# Patient Record
Sex: FEMALE | Race: BLACK OR AFRICAN AMERICAN | URBAN - METROPOLITAN AREA
[De-identification: names, ages, dates, MRNs, and addresses within clinical notes are randomized per-mention and may not be internally consistent; named-entity substitution may affect disease eponyms.]

---

## 2018-08-29 ENCOUNTER — TELEPHONE (OUTPATIENT)
Dept: FAMILY MEDICINE CLINIC | Age: 48
End: 2018-08-29

## 2018-08-29 NOTE — TELEPHONE ENCOUNTER
Request sent to us for triancinolone 0.1% 30g cubes. We have not prescribed this since 2015. Patient last seen in office 12/17 was told to follow up in 6 months. Please advise.

## 2018-08-30 RX ORDER — TRIAMCINOLONE ACETONIDE 1 MG/G
CREAM TOPICAL 3 TIMES DAILY
Qty: 15 G | Refills: 0 | Status: SHIPPED | OUTPATIENT
Start: 2018-08-30 | End: 2018-12-20 | Stop reason: SDUPTHER

## 2018-08-30 NOTE — TELEPHONE ENCOUNTER
Patient called stating that she had requested a refill but no called to tell her that she needed to be seen to get a refill. Patient has been scheduled for 9/7/18 at 3:30. Patient says she has exzema and has a lot of itching but wanted to know if she could get some sort of script to get her through the weekend. Patient would like a call back. Thank you.

## 2018-10-01 RX ORDER — SUMATRIPTAN 25 MG/1
25 TABLET, FILM COATED ORAL
COMMUNITY
End: 2018-12-20 | Stop reason: SDUPTHER

## 2018-10-01 RX ORDER — VALSARTAN AND HYDROCHLOROTHIAZIDE 160; 12.5 MG/1; MG/1
1 TABLET, FILM COATED ORAL DAILY
COMMUNITY
End: 2018-12-20 | Stop reason: SDUPTHER

## 2018-10-01 RX ORDER — AZELASTINE HYDROCHLORIDE 0.5 MG/ML
SOLUTION/ DROPS OPHTHALMIC 2 TIMES DAILY
COMMUNITY
End: 2018-12-20 | Stop reason: ALTCHOICE

## 2018-12-20 ENCOUNTER — OFFICE VISIT (OUTPATIENT)
Dept: FAMILY MEDICINE CLINIC | Age: 48
End: 2018-12-20

## 2018-12-20 VITALS
BODY MASS INDEX: 33.95 KG/M2 | HEIGHT: 68 IN | OXYGEN SATURATION: 100 % | SYSTOLIC BLOOD PRESSURE: 146 MMHG | DIASTOLIC BLOOD PRESSURE: 88 MMHG | WEIGHT: 224 LBS | TEMPERATURE: 98.1 F | HEART RATE: 91 BPM | RESPIRATION RATE: 16 BRPM

## 2018-12-20 DIAGNOSIS — Z00.00 ANNUAL PHYSICAL EXAM: Primary | ICD-10-CM

## 2018-12-20 DIAGNOSIS — D50.9 IRON DEFICIENCY ANEMIA, UNSPECIFIED IRON DEFICIENCY ANEMIA TYPE: ICD-10-CM

## 2018-12-20 DIAGNOSIS — E55.9 VITAMIN D DEFICIENCY: ICD-10-CM

## 2018-12-20 DIAGNOSIS — G43.709 CHRONIC MIGRAINE WITHOUT AURA WITHOUT STATUS MIGRAINOSUS, NOT INTRACTABLE: ICD-10-CM

## 2018-12-20 DIAGNOSIS — L30.8 OTHER ECZEMA: ICD-10-CM

## 2018-12-20 DIAGNOSIS — T78.40XD ALLERGIC STATE, SUBSEQUENT ENCOUNTER: ICD-10-CM

## 2018-12-20 DIAGNOSIS — I10 ESSENTIAL HYPERTENSION: ICD-10-CM

## 2018-12-20 RX ORDER — DIAPER,BRIEF,INFANT-TODD,DISP
EACH MISCELLANEOUS 2 TIMES DAILY
Qty: 30 G | Refills: 0 | Status: SHIPPED | OUTPATIENT
Start: 2018-12-20 | End: 2020-01-07 | Stop reason: SDUPTHER

## 2018-12-20 RX ORDER — SUMATRIPTAN 25 MG/1
25 TABLET, FILM COATED ORAL
Qty: 9 TAB | Refills: 1 | Status: SHIPPED | OUTPATIENT
Start: 2018-12-20 | End: 2018-12-20

## 2018-12-20 RX ORDER — VALSARTAN AND HYDROCHLOROTHIAZIDE 160; 12.5 MG/1; MG/1
1 TABLET, FILM COATED ORAL DAILY
Qty: 90 TAB | Refills: 1 | Status: SHIPPED | OUTPATIENT
Start: 2018-12-20 | End: 2019-01-03

## 2018-12-20 RX ORDER — LEVONORGESTREL AND ETHINYL ESTRADIOL 6-5-10
KIT ORAL
Refills: 1 | COMMUNITY
Start: 2018-10-20 | End: 2019-01-14 | Stop reason: SDUPTHER

## 2018-12-20 RX ORDER — EPINEPHRINE 0.3 MG/.3ML
0.3 INJECTION SUBCUTANEOUS
Qty: 2 SYRINGE | Refills: 1 | Status: SHIPPED | OUTPATIENT
Start: 2018-12-20 | End: 2018-12-20

## 2018-12-20 RX ORDER — ERGOCALCIFEROL 1.25 MG/1
50000 CAPSULE ORAL
Qty: 12 CAP | Refills: 1 | Status: SHIPPED | OUTPATIENT
Start: 2018-12-20 | End: 2019-06-24 | Stop reason: SDUPTHER

## 2018-12-20 RX ORDER — TRIAMCINOLONE ACETONIDE 1 MG/G
CREAM TOPICAL 3 TIMES DAILY
Qty: 15 G | Refills: 0 | Status: SHIPPED | OUTPATIENT
Start: 2018-12-20 | End: 2019-01-30 | Stop reason: SDUPTHER

## 2018-12-20 RX ORDER — EPINEPHRINE 0.3 MG/.3ML
0.3 INJECTION SUBCUTANEOUS
COMMUNITY
End: 2018-12-20 | Stop reason: SDUPTHER

## 2018-12-20 NOTE — PROGRESS NOTES
Subjective  Reji Mena is an 51 y.o. female who presents for annual physical    HPI  Annual Physical  Last Physical 1 year ago  Screening: uptodate on PAP, needs mammogram  Immunization: had flu shot this year  Diet: avoid reds meat, eats chicken and fish, eats more vegetables than carbs  Exercise: crossfit 3x per week        Review of Systems   Constitutional: Negative for appetite change. Negative for activity change, chills, diaphoresis and fatigue. HENT: Negative for congestion and dental problem. Eyes: Negative for discharge. Respiratory: Negative for apnea and chest tightness. Cardiovascular: Negative for chest pain and leg swelling. Allergies - reviewed: Allergies   Allergen Reactions    Fruit C [Ascorbic Acid (Vitamin C)] Hives    Nuts [Tree Nut] Hives    Pcn [Penicillins] Unknown (comments)     None noted          Medications - reviewed:   Current Outpatient Medications   Medication Sig    TRIVORA, 28, 50-30 (6)/75-40 (5)/125-30(10) tab TK 1 T PO QD AS DIRECTED    ergocalciferol (VITAMIN D2) 50,000 unit capsule Take 1 Cap by mouth every seven (7) days.  triamcinolone acetonide (KENALOG) 0.1 % topical cream Apply  to affected area three (3) times daily. use thin layer    hydrocortisone (CORTAID) 0.5 % topical cream Apply  to affected area two (2) times a day. use thin layer    valsartan-hydroCHLOROthiazide (DIOVAN HCT) 160-12.5 mg per tablet Take 1 Tab by mouth daily. No current facility-administered medications for this visit.           Past Medical History - reviewed:  Past Medical History:   Diagnosis Date    Eczema     HTN (hypertension)     Hyperlipidemia     Vitamin D deficiency          Past Surgical History - reviewed:   Past Surgical History:   Procedure Laterality Date    HX LIPOSUCTION           Social History - reviewed:  Social History     Socioeconomic History    Marital status:      Spouse name: Not on file    Number of children: Not on file  Years of education: Not on file    Highest education level: Not on file   Social Needs    Financial resource strain: Not on file    Food insecurity - worry: Not on file    Food insecurity - inability: Not on file    Transportation needs - medical: Not on file   [employer] needs - non-medical: Not on file   Occupational History    Not on file   Tobacco Use    Smoking status: Never Smoker    Smokeless tobacco: Never Used   Substance and Sexual Activity    Alcohol use: No     Frequency: Never    Drug use: No    Sexual activity: Yes   Other Topics Concern    Not on file   Social History Narrative    Not on file         Family History - reviewed:  No family history on file. Visit Vitals  /88   Pulse 91   Temp 98.1 °F (36.7 °C) (Oral)   Resp 16   Ht 5' 8\" (1.727 m)   Wt 224 lb (101.6 kg)   SpO2 100%   BMI 34.06 kg/m²       Physical Exam   Constitutional: well-developed and well-nourished. HENT:   Head: Normocephalic and atraumatic. Eyes: Conjunctivae are normal. Pupils are equal, round, and reactive to light. Neck: Normal range of motion. Neck supple. No JVD present. No tracheal deviation present. No thyromegaly present. Cardiovascular: Normal rate, regular rhythm and normal heart sounds. Exam reveals no friction rub. No murmur heard. Pulmonary/Chest: Effort normal and breath sounds normal. No stridor. No respiratory distress. no wheezes. no rales. no tenderness. Assessment/Plan  1. Annual physical exam: uptodate on immunizations, will order mammogram for yearly screening; will check yearly labs  - CBC WITH AUTOMATED DIFF  - LIPID PANEL  - [facility] MAMMO RT SCREENING INCL CAD; Future  -discussed diet, exercise, and age appropriate recommendations    2. Allergic state, subsequent encounter  - EPINEPHrine (EPIPEN) 0.3 mg/0.3 mL injection; 0.3 mL by IntraMUSCular route once as needed for up to 1 dose. Dispense: 2 Syringe; Refill: 1    3.  Essential hypertension: BP slightly elevated today. Will bring back in 1 week for nurse BP check  - valsartan-hydroCHLOROthiazide (DIOVAN HCT) 160-12.5 mg per tablet; Take 1 Tab by mouth daily. Dispense: 90 Tab; Refill: 1  - METABOLIC PANEL, COMPREHENSIVE  - MICROALBUMIN, UR, RAND W/ MICROALB/CREAT RATIO    4. Vitamin D deficiency  - ergocalciferol (VITAMIN D2) 50,000 unit capsule; Take 1 Cap by mouth every seven (7) days. Dispense: 12 Cap; Refill: 1  - VITAMIN D, 25 HYDROXY; Future    5. Chronic migraine without aura without status migrainosus, not intractable  - SUMAtriptan (IMITREX) 25 mg tablet; Take 1 Tab by mouth once as needed for Migraine for up to 1 dose. Dispense: 9 Tab; Refill: 1    6. Other eczema  - triamcinolone acetonide (KENALOG) 0.1 % topical cream; Apply  to affected area three (3) times daily. use thin layer  Dispense: 15 g; Refill: 0  - hydrocortisone (CORTAID) 0.5 % topical cream; Apply  to affected area two (2) times a day. use thin layer  Dispense: 30 g; Refill: 0    7. Iron deficiency anemia, unspecified iron deficiency anemia type: hx of iron deficiency. Will check CBC to evaluate further  -CBC    8. BMI 34.0-34.9,adult  -discussed diet and exercise recommendations          Follow-up Disposition:  Return in about 2 weeks (around 1/3/2019) for nurse visit for BP check. I have discussed the diagnosis with the patient and the intended plan as seen in the above orders. Patient verbalized understanding of the plan and agrees with the plan. The patient has received an after-visit summary and questions were answered concerning future plans. I have discussed medication side effects and warnings with the patient as well. Informed patient to return to the office if new symptoms arise.         Lb Lema MD  Family Medicine Resident

## 2018-12-20 NOTE — PROGRESS NOTES
Chief Complaint   Patient presents with    Physical     REQUESTING A PAP IF NEEDED    STATES PAP WAS A COUPLE OF YEARS AGO        Health Maintenance Due   Topic    PAP AKA CERVICAL CYTOLOGY     Influenza Age 5 to Adult        Wt Readings from Last 3 Encounters:   12/20/18 224 lb (101.6 kg)     Temp Readings from Last 3 Encounters:   12/20/18 98.1 °F (36.7 °C) (Oral)     BP Readings from Last 3 Encounters:   12/20/18 175/88     Pulse Readings from Last 3 Encounters:   12/20/18 91         Learning Assessment:  :     No flowsheet data found. Depression Screening:  :     PHQ over the last two weeks 12/20/2018   Little interest or pleasure in doing things Not at all   Feeling down, depressed, irritable, or hopeless Not at all   Total Score PHQ 2 0       Fall Risk Assessment:  :     No flowsheet data found. Abuse Screening:  :     No flowsheet data found. Coordination of Care Questionnaire:  :     1) Have you been to an emergency room, urgent care clinic since your last visit? NO   Hospitalized since your last visit? NO             2) Have you seen or consulted any other health care providers outside of [address] [place] [address] since your last visit? NO    (Include any pap smears or colon screenings in this section. PAP  8/2015  NORMAL      Patient is accompanied by self I have received verbal consent from Jose Villafana to discuss any/all medical information while they are present in the room.

## 2018-12-20 NOTE — PATIENT INSTRUCTIONS
Barrier Methods of Birth Control: Care Instructions  Your Care Instructions    Barrier methods of birth control prevent pregnancy by blocking sperm. This stops the sperm from reaching an egg. Types of barrier methods include condoms, diaphragms, cervical caps, and the contraceptive sponge. Barrier methods work better when you use them with a spermicide. This is a substance that kills sperm. Spermicides come in many forms--cream, jelly, gel, foam, film, and suppository. Sometimes they are used alone as a birth control method. In general, barrier methods don't prevent pregnancy as well as IUDs or hormonal methods. The male condom and diaphragm are the barrier methods that work best. The cervical cap and sponge work about as well as a condom or a diaphragm for women who have not had a vaginal birth. But the cap and sponge don't work as well for women who have had a vaginal birth. The female condom does not work as well as a male condom. Use of spermicide alone does not work well to prevent pregnancy. Condoms also protect against sexually transmitted infections (STIs) such as HIV/AIDS and herpes. Other barrier methods do not protect against most STIs. Follow-up care is a key part of your treatment and safety. Be sure to make and go to all appointments, and call your doctor if you are having problems. It's also a good idea to know your test results and keep a list of the medicines you take. What kinds of barrier birth control are available? Barrier methods of birth control include:  · Male condom. This is a thin tube that fits over the penis. Condoms can be made of rubber (latex), plastic, or lambskin. They prevent sperm from getting into the vagina. Rubber and plastic condoms also protect against STIs. Lambskin condoms do not protect against STIs. The condom is placed over the erect penis right before sex. A new condom must be used each time the man has sex.  After 1 year, 13 out of 80 women whose partners use condoms will get pregnant. You can buy condoms without a doctor's prescription. · Female condom. This is a thin plastic pouch that is open on one end. The closed end is placed inside the vagina. The condom then lines the walls of the vagina and prevents sperm from getting into the vagina. The female condom also protects against STIs. A new condom must be used each time the woman has sex. After 1 year, 21 out of 80 women who use female condoms will get pregnant. You can buy female condoms without a doctor's prescription. · Diaphragm. This is a rubber dome with a firm, flexible rim. It fits inside a woman's vagina and covers the opening of the uterus (called the cervix). A diaphragm is always used with spermicide. A woman puts in the diaphragm no more than 6 hours before she has sex. After 1 year, 16 out of 80 women who use a diaphragm will get pregnant. You need a doctor's exam and a prescription to get a diaphragm. With good care, a diaphragm lasts 1 to 2 years. · Cervical cap. This is a rubber device. It fits inside the vagina, right up against the cervix. The cervical cap is always used with a spermicide. You need a doctor's prescription to get a cervical cap. After 1 year, 16 out of 100 women who use the cap and who have not had a vaginal delivery will get pregnant. Out of 100 women who have had a vaginal delivery, 28 will get pregnant after 1 year. A cervical cap can last for up to 2 years. · Contraceptive sponge. This is a thick plastic foam disc. It fits inside the vagina and covers the cervix. It also releases a spermicide. A woman wets the sponge and then inserts it into her vagina. She is then protected against pregnancy for the next 24 hours, even if she has sex more than once. After 1 year, 16 out of 100 women who use the sponge and who have not had a vaginal delivery will get pregnant. Out of 100 women who have had a vaginal delivery, 28 will get pregnant after 1 year.  You can buy the sponge without a doctor's prescription. · Spermicide. This is a substance that kills sperm. You can buy it as jelly, foam, cream, suppository, and film. The most common spermicide is called nonoxynol-9. Most spermicides come with an applicator, which is filled and put in the vagina about 15 minutes before sex. More spermicide must be used each time the woman has sex. Spermicide used alone does not work well to prevent pregnancy. After 1 year, 34 out of 100 women who use spermicide alone will get pregnant. You can buy spermicide without a doctor's prescription. What are the advantages of barrier methods of birth control? · Condoms protect against pregnancy. They also are the only method that may protect against STIs such as HIV/AIDS and herpes. · Barrier methods are safe to use while breastfeeding. · Barrier methods do not use hormones. So they are safe for women who smoke or who have health problems such as heart disease or blood clots. · These methods do not affect a woman's menstrual cycle. The ability to get pregnant returns as soon as a woman stops using birth control. · Barrier methods cost less than hormonal types of birth control. · You do not need a doctor's prescription for condoms, the contraceptive sponge, or spermicides. What are the disadvantages of barrier methods of birth control? · These methods do not prevent pregnancy as well as IUDs or hormonal forms of birth control. · Barrier methods prevent pregnancy only if you use them every time you have sex. · You may have to interrupt sex to use some barrier methods of birth control. · A woman needs a doctor's prescription to get a diaphragm or cervical cap. · The cervical cap and contraceptive sponge do not work as well as the other barrier methods for women who have delivered a child through the vagina. · The cervical cap and diaphragm can't be used by people who are allergic to latex or by women who have had toxic shock syndrome.   · The cervical cap should not be used during a menstrual period. Where can you learn more? Go to http://yesenia-jumana.info/. Enter B152 in the search box to learn more about \"Barrier Methods of Birth Control: Care Instructions. \"  Current as of: November 21, 2017  Content Version: 11.8  © 9129-4361 [employer]. Care instructions adapted under license by [employer] (which disclaims liability or warranty for this information). If you have questions about a medical condition or this instruction, always ask your healthcare professional. [address] any warranty or liability for your use of this information.

## 2019-01-03 ENCOUNTER — OFFICE VISIT (OUTPATIENT)
Dept: FAMILY MEDICINE CLINIC | Age: 49
End: 2019-01-03

## 2019-01-03 ENCOUNTER — TELEPHONE (OUTPATIENT)
Dept: FAMILY MEDICINE CLINIC | Age: 49
End: 2019-01-03

## 2019-01-03 VITALS
HEART RATE: 87 BPM | DIASTOLIC BLOOD PRESSURE: 100 MMHG | OXYGEN SATURATION: 98 % | RESPIRATION RATE: 16 BRPM | WEIGHT: 224 LBS | HEIGHT: 64 IN | TEMPERATURE: 98.2 F | BODY MASS INDEX: 38.24 KG/M2 | SYSTOLIC BLOOD PRESSURE: 171 MMHG

## 2019-01-03 DIAGNOSIS — I10 ESSENTIAL HYPERTENSION: Primary | ICD-10-CM

## 2019-01-03 RX ORDER — LOSARTAN POTASSIUM 100 MG/1
100 TABLET ORAL DAILY
Qty: 30 TAB | Refills: 2 | Status: SHIPPED | OUTPATIENT
Start: 2019-01-03 | End: 2019-01-26 | Stop reason: SDUPTHER

## 2019-01-03 RX ORDER — EPINEPHRINE 0.3 MG/.3ML
INJECTION SUBCUTANEOUS
Refills: 1 | COMMUNITY
Start: 2018-12-24 | End: 2020-09-10 | Stop reason: SDUPTHER

## 2019-01-03 RX ORDER — PREDNISONE 50 MG/1
TABLET ORAL
Refills: 0 | COMMUNITY
Start: 2019-01-01 | End: 2019-01-30

## 2019-01-03 NOTE — PROGRESS NOTES
A user error has taken place: encounter opened in error, closed for administrative reasons  Patient seeing provider in place of today's nurse visit .

## 2019-01-03 NOTE — TELEPHONE ENCOUNTER
Had patient added onto Dr. Josephine Toney schedule rather than sending a message to the provider when circumstances called for it. No message needed now.

## 2019-01-03 NOTE — PROGRESS NOTES
Abdirizak Chase is a 51 y.o. female presenting for Hospital Follow Up (allergic reaction to [address], needs new BP medication ) and Follow-up (hypertnsion, bp elevated, needs new BP medication ) History of Present Illness: HTN The patient presents today for HTN follow-up. Had previously been taking diovan for years without problems. Was switched to diovan HCTZ for additional BP elevation. Had angioedema and seen in ER. Now on NO meds and notes BP is up Also allergic to peanuts. Has Epipen. Home BP readings range elevated SIde effects of meds:  As noted above Review of Systems Respiratory: Negative for shortness of breath. Cardiovascular: Negative for chest pain. Past Medical History:  
Diagnosis Date  Eczema   
 HTN (hypertension)  Hyperlipidemia  Vitamin D deficiency Past Surgical History:  
Procedure Laterality Date  HX LIPOSUCTION Family History Problem Relation Age of Onset  Other Mother   
     Roberto Keller Social History Socioeconomic History  Marital status:  Spouse name: Not on file  Number of children: Not on file  Years of education: Not on file  Highest education level: Not on file Social Needs  Financial resource strain: Not on file  Food insecurity - worry: Not on file  Food insecurity - inability: Not on file  Transportation needs - medical: Not on file  Transportation needs - non-medical: Not on file Occupational History  Not on file Tobacco Use  Smoking status: Never Smoker  Smokeless tobacco: Never Used Substance and Sexual Activity  Alcohol use: No  
  Frequency: Never  Drug use: No  
 Sexual activity: Yes  
  Partners: Male Birth control/protection: Pill Other Topics Concern  Not on file Social History Narrative  Not on file Current Outpatient Medications Medication Sig Dispense Refill  EPINEPHrine (EPIPEN) 0.3 mg/0.3 mL injection INJECT 0.3 ML INTRAMUSCULARLY ONCE AS NEEDED FOR UP TO 1 DOSE  1  
 predniSONE (DELTASONE) 50 mg tablet TK 1 T PO  D  0  
 sumatriptan succ/naproxen sod (SUMATRIPTAN-NAPROXEN PO) Take  by mouth.  losartan (COZAAR) 100 mg tablet Take 1 Tab by mouth daily. 30 Tab 2  TRIVORA, 28, 50-30 (6)/75-40 (5)/125-30(10) tab TK 1 T PO QD AS DIRECTED  1  
 ergocalciferol (VITAMIN D2) 50,000 unit capsule Take 1 Cap by mouth every seven (7) days. 12 Cap 1  
 triamcinolone acetonide (KENALOG) 0.1 % topical cream Apply  to affected area three (3) times daily. use thin layer 15 g 0  
 hydrocortisone (CORTAID) 0.5 % topical cream Apply  to affected area two (2) times a day. use thin layer 30 g 0 Allergies Allergen Reactions  Hydrochlorothiazide Swelling LIP SWELLING  
 Fruit C [Ascorbic Acid (Vitamin C)] Hives  Nuts [Tree Nut] Hives  Pcn [Penicillins] Unknown (comments) None noted Vitals:  
 01/03/19 1441 BP: (!) 171/100 Pulse: 87 Resp: 16 Temp: 98.2 °F (36.8 °C) TempSrc: Oral  
SpO2: 98% Weight: 224 lb (101.6 kg) Height: 5' 4\" (1.626 m) Body mass index is 38.45 kg/m². Objective General: Patient alert and oriented and in NAD HEENT: PER/EOMI, no conjunctival pallor or scleral icterus. No thyromegaly or cervical lymphadenopathy Heart: Regular rate and rhythm, No murmurs, rubs or gallops. Lungs: Clear to auscultation bilaterally, no wheezing, rales or rhonchi Ext: No edema Skin: No rashes or lesions noted on exposed skin Neuro: AAOx3 Psych: Appropriate mood and affect Assessment and Plan: 1. Essential hypertension Losartan 100 mg every day #30 +2 refills. 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 Diagnosis and plan discussed with patient who verbillized understanding. Follow-up Disposition: Not on File [facility]

## 2019-01-03 NOTE — PROGRESS NOTES
Identified pt with two pt identifiers(name and ). Chief Complaint Patient presents with  
[facility] Follow Up  
  allergic reaction to [address], needs new BP medication  Follow-up  
  hypertnsion Health Maintenance Due Topic  PAP AKA CERVICAL CYTOLOGY  Influenza Age 5 to Adult Wt Readings from Last 3 Encounters:  
19 224 lb (101.6 kg) 18 224 lb (101.6 kg) Temp Readings from Last 3 Encounters:  
19 98.2 °F (36.8 °C) (Oral) 18 98.1 °F (36.7 °C) (Oral) BP Readings from Last 3 Encounters:  
19 (!) 171/100  
18 146/88 Pulse Readings from Last 3 Encounters:  
19 87  
18 91 Learning Assessment: 
:  
 
No flowsheet data found. Depression Screening: 
:  
 
PHQ over the last two weeks 2018 Little interest or pleasure in doing things Not at all Feeling down, depressed, irritable, or hopeless Not at all Total Score PHQ 2 0 Fall Risk Assessment: 
:  
 
No flowsheet data found. Abuse Screening: 
:  
 
No flowsheet data found. Coordination of Care Questionnaire: 
:  
 
1) Have you been to an emergency room, urgent care clinic since your last visit? yes Seen at [facility] for lip swelling after taking valsartan hctz Hospitalized since your last visit? no          
 
2) Have you seen or consulted any other health care providers outside of [address] [place] [address] since your last visit? no  (Include any pap smears or colon screenings in this section.) 3) Do you have an Advance Directive on file? no 
Are you interested in receiving information about Advance Directives? no 
 
Patient is accompanied by self I have received verbal consent from Sarah Sands to discuss any/all medical information while they are present in the room. Reviewed record in preparation for visit and have obtained necessary documentation. Medication reconciliation up to date and corrected with patient at this time.

## 2019-01-14 ENCOUNTER — TELEPHONE (OUTPATIENT)
Dept: FAMILY MEDICINE CLINIC | Age: 49
End: 2019-01-14

## 2019-01-14 DIAGNOSIS — R93.89 ABNORMAL FINDING ON ULTRASOUND: Primary | ICD-10-CM

## 2019-01-14 NOTE — TELEPHONE ENCOUNTER
ICD-10-CM ICD-9-CM    1. Abnormal finding on ultrasound R93.89 793.99        Called patient regarding recent US with suspicious finding. She states that she is aware, and she is scheduled for biopsy tomorrow. Advised patient that if she needs anything or has any questions she can call.      Tang Kim MD

## 2019-01-28 RX ORDER — LOSARTAN POTASSIUM 100 MG/1
TABLET ORAL
Qty: 30 TAB | Refills: 0 | Status: SHIPPED | OUTPATIENT
Start: 2019-01-28 | End: 2019-02-27 | Stop reason: SDUPTHER

## 2019-01-30 ENCOUNTER — OFFICE VISIT (OUTPATIENT)
Dept: FAMILY MEDICINE CLINIC | Age: 49
End: 2019-01-30

## 2019-01-30 VITALS
HEART RATE: 92 BPM | HEIGHT: 64 IN | SYSTOLIC BLOOD PRESSURE: 182 MMHG | BODY MASS INDEX: 38.58 KG/M2 | DIASTOLIC BLOOD PRESSURE: 106 MMHG | RESPIRATION RATE: 18 BRPM | OXYGEN SATURATION: 100 % | WEIGHT: 226 LBS | TEMPERATURE: 98.3 F

## 2019-01-30 DIAGNOSIS — L30.8 OTHER ECZEMA: ICD-10-CM

## 2019-01-30 DIAGNOSIS — I10 ESSENTIAL HYPERTENSION: Primary | ICD-10-CM

## 2019-01-30 RX ORDER — TRIAMCINOLONE ACETONIDE 1 MG/G
CREAM TOPICAL 3 TIMES DAILY
Qty: 60 G | Refills: 2 | Status: SHIPPED | OUTPATIENT
Start: 2019-01-30 | End: 2020-01-07 | Stop reason: SDUPTHER

## 2019-01-30 RX ORDER — AMLODIPINE BESYLATE 5 MG/1
5 TABLET ORAL DAILY
Qty: 90 TAB | Refills: 1 | Status: SHIPPED | OUTPATIENT
Start: 2019-01-30 | End: 2019-06-24 | Stop reason: SDUPTHER

## 2019-01-30 NOTE — PROGRESS NOTES
1. Have you been to the ER, urgent care clinic since your last visit? Hospitalized since your last visit? No 
 
2. Have you seen or consulted any other health care providers outside of the [address] [place] [address] since your last visit? Include any pap smears or colon screening. No 
 
 
Patient took BP at home: 162/90- 1/11/19 
160/88- 1/25/19

## 2019-01-30 NOTE — PROGRESS NOTES
Jes Herman is a 51 y.o. female presenting for Hypertension History of Present Illness: Hypertension On losartan Taking consistently No side effects BPS up in school nurses office No chest pain or dyspnea, No mouth swelling Review of Systems HENT: Negative for sore throat. Respiratory: Negative for shortness of breath and stridor. Cardiovascular: Negative for chest pain and leg swelling. Past Medical History:  
Diagnosis Date  Eczema   
 HTN (hypertension)  Hyperlipidemia  Vitamin D deficiency Past Surgical History:  
Procedure Laterality Date  HX LIPOSUCTION Family History Problem Relation Age of Onset  Other Mother   
     Shari Ramirez Social History Socioeconomic History  Marital status:  Spouse name: Not on file  Number of children: Not on file  Years of education: Not on file  Highest education level: Not on file Social Needs  Financial resource strain: Not on file  Food insecurity - worry: Not on file  Food insecurity - inability: Not on file  Transportation needs - medical: Not on file  Transportation needs - non-medical: Not on file Occupational History  Not on file Tobacco Use  Smoking status: Never Smoker  Smokeless tobacco: Never Used Substance and Sexual Activity  Alcohol use: No  
  Frequency: Never  Drug use: No  
 Sexual activity: Yes  
  Partners: Male Birth control/protection: Pill Other Topics Concern  Not on file Social History Narrative  Not on file Current Outpatient Medications Medication Sig Dispense Refill  triamcinolone acetonide (KENALOG) 0.1 % topical cream Apply  to affected area three (3) times daily. use thin layer 60 g 2  
 amLODIPine (NORVASC) 5 mg tablet Take 1 Tab by mouth daily.  90 Tab 1  
 losartan (COZAAR) 100 mg tablet TAKE 1 TABLET BY MOUTH DAILY 30 Tab 0  
 EPINEPHrine (EPIPEN) 0.3 mg/0.3 mL injection INJECT 0.3 ML INTRAMUSCULARLY ONCE AS NEEDED FOR UP TO 1 DOSE  1  
 sumatriptan succ/naproxen sod (SUMATRIPTAN-NAPROXEN PO) Take  by mouth.  ergocalciferol (VITAMIN D2) 50,000 unit capsule Take 1 Cap by mouth every seven (7) days. 12 Cap 1  
 hydrocortisone (CORTAID) 0.5 % topical cream Apply  to affected area two (2) times a day. use thin layer 30 g 0 Allergies Allergen Reactions  Hydrochlorothiazide Swelling LIP SWELLING  
 Fruit C [Ascorbic Acid (Vitamin C)] Hives  Nuts [Tree Nut] Hives  Pcn [Penicillins] Unknown (comments) None noted Vitals:  
 01/30/19 1338 BP: (!) 182/106 Pulse: 92 Resp: 18 Temp: 98.3 °F (36.8 °C) TempSrc: Oral  
SpO2: 100% Weight: 226 lb (102.5 kg) Height: 5' 4\" (1.626 m) Body mass index is 38.79 kg/m². Objective General: Patient alert and oriented and in NAD HEENT: PER/EOMI, no conjunctival pallor or scleral icterus. No thyromegaly or cervical lymphadenopathy Heart: Regular rate and rhythm, No murmurs, rubs or gallops. Lungs: Clear to auscultation bilaterally, no wheezing, rales or rhonchi Ext: No edema Skin: No rashes or lesions noted on exposed skin Neuro: AAOx3 Psych: Appropriate mood and affect Assessment and Plan: 1. Other eczema Refill  
- triamcinolone acetonide (KENALOG) 0.1 % topical cream; Apply  to affected area three (3) times daily. use thin layer  Dispense: 60 g; Refill: 2 2. Essential hypertension Add amlodipine, continue losartan. Diet and lifestyle effects on BP discussed, Non drinker 
- amLODIPine (NORVASC) 5 mg tablet; Take 1 Tab by mouth daily. Dispense: 90 Tab; Refill: 1 Diagnosis and plan discussed with patient who verbillized understanding. Follow-up Disposition: 
Return in about 1 month (around 2/28/2019) for Blood pressure follow up.  
 
[facility]

## 2019-06-24 ENCOUNTER — OFFICE VISIT (OUTPATIENT)
Dept: FAMILY MEDICINE CLINIC | Age: 49
End: 2019-06-24

## 2019-06-24 VITALS
BODY MASS INDEX: 38.07 KG/M2 | SYSTOLIC BLOOD PRESSURE: 139 MMHG | HEART RATE: 76 BPM | WEIGHT: 223 LBS | TEMPERATURE: 98.3 F | HEIGHT: 64 IN | OXYGEN SATURATION: 99 % | RESPIRATION RATE: 16 BRPM | DIASTOLIC BLOOD PRESSURE: 86 MMHG

## 2019-06-24 DIAGNOSIS — E55.9 VITAMIN D DEFICIENCY: ICD-10-CM

## 2019-06-24 DIAGNOSIS — I10 ESSENTIAL HYPERTENSION: ICD-10-CM

## 2019-06-24 DIAGNOSIS — N95.1 MENOPAUSAL SYMPTOMS: ICD-10-CM

## 2019-06-24 DIAGNOSIS — G47.00 INSOMNIA, UNSPECIFIED TYPE: Primary | ICD-10-CM

## 2019-06-24 DIAGNOSIS — E78.00 HYPERCHOLESTEREMIA: ICD-10-CM

## 2019-06-24 RX ORDER — TRAZODONE HYDROCHLORIDE 50 MG/1
50 TABLET ORAL
Qty: 30 TAB | Refills: 2 | Status: SHIPPED | OUTPATIENT
Start: 2019-06-24 | End: 2020-01-07 | Stop reason: ALTCHOICE

## 2019-06-24 RX ORDER — FAMOTIDINE 20 MG/1
TABLET, FILM COATED ORAL
Refills: 0 | COMMUNITY
Start: 2019-03-29 | End: 2020-01-07 | Stop reason: ALTCHOICE

## 2019-06-24 RX ORDER — PANTOPRAZOLE SODIUM 40 MG/1
TABLET, DELAYED RELEASE ORAL
Refills: 0 | COMMUNITY
Start: 2019-03-29 | End: 2020-01-07 | Stop reason: ALTCHOICE

## 2019-06-24 RX ORDER — LOSARTAN POTASSIUM 100 MG/1
100 TABLET ORAL DAILY
Qty: 90 TAB | Refills: 1 | Status: SHIPPED | OUTPATIENT
Start: 2019-06-24 | End: 2019-09-16 | Stop reason: SDUPTHER

## 2019-06-24 RX ORDER — ERGOCALCIFEROL 1.25 MG/1
50000 CAPSULE ORAL
Qty: 12 CAP | Refills: 1 | Status: SHIPPED | OUTPATIENT
Start: 2019-06-24 | End: 2020-01-07 | Stop reason: SDUPTHER

## 2019-06-24 RX ORDER — AMLODIPINE BESYLATE 5 MG/1
5 TABLET ORAL DAILY
Qty: 90 TAB | Refills: 1 | Status: SHIPPED | OUTPATIENT
Start: 2019-06-24 | End: 2019-07-27 | Stop reason: SDUPTHER

## 2019-06-24 NOTE — PATIENT INSTRUCTIONS
The goal blood pressure for most patients is 140/90. The whole point of treating blood pressure is to prevent strokes, heart attacks and kidney damage. Persistently elevated blood pressure damages blood vessels and can lead to catastrophic heart problems and strokes. Recommendations for Hypertension (elevated blood pressure):    · Purchase a blood pressure cuff that goes around your upper arm and check blood pressure at least 3 times per week. Write down your numbers for review. Check blood pressure in the morning and evening. Rest for 5 minutes with feet elevated and arm resting on a table (at mid-chest level) when checking blood pressure    · Exercise 30-60 minutes most days of the week, preferably with a mix of cardiovascular and strength training. Exercise can improve blood pressure, even if you do not lose weight! · Limit alcohol intake to less than 3-4 drinks per week. · Avoid tobacco products    · Avoid illegal drugs especially amphetamines  · DASH diet - includes fruits, vegetables, fiber, and less than 2000 mg sodium (salt) daily. · Try to eat a low sugar diet. Sugar worsens diabetes and activates kidney hormones that raise blood pressure.    · Avoid non-steroidal inflammatory medications (NSAIDS) such as ibuprofen, advil, motrin, aleve, and naproxyn as these may increase blood pressure if used long-term    · Avoid OTC decongestants such as pseudopherine, phenylephrine, Afrin  · Take your blood pressure medicine (and aspirin if prescribed) religiously

## 2019-06-24 NOTE — PROGRESS NOTES
Dino Pan is a 51 y.o. female      Chief Complaint   Patient presents with    Medication Refill     Protonix 40/ Losartan/ Amlodopine/ Vitamin D / Pepcid          1. Have you been to the ER, urgent care clinic since your last visit? Hospitalized since your last visit? No      2. Have you seen or consulted any other health care providers outside of the [address] [place] [address] since your last visit? Include any pap smears or colon screening.   3/19 Dr Carmelo Noe

## 2019-06-24 NOTE — PROGRESS NOTES
Assessment and Plan    1. Essential hypertension  At goal, takes meds consistently  - losartan (COZAAR) 100 mg tablet; Take 1 Tab by mouth daily. Dispense: 90 Tab; Refill: 1  - amLODIPine (NORVASC) 5 mg tablet; Take 1 Tab by mouth daily. Dispense: 90 Tab; Refill: 1  - MICROALBUMIN, UR, RAND W/ MICROALB/CREAT RATIO  - METABOLIC PANEL, BASIC    2. Insomnia, unspecified type  Trial of trazadone, already takes Benedryl  - traZODone (DESYREL) 50 mg tablet; Take 1 Tab by mouth nightly. Dispense: 30 Tab; Refill: 2    3. Hypercholesteremia  Check status  - LIPID PANEL  - HEPATIC FUNCTION PANEL    4. Menopausal symptoms  Hot flashes, loss of libido, options for care all discussed      Follow-up and Dispositions    · Return in about 6 months (around 12/24/2019) for Blood pressure follow up, Medication follow up. Diagnosis and plan discussed with patient who verbillized understanding. History of present Johnny Pham is a 51 y.o. female presenting for Medication Refill (Protonix 40/ Losartan/ Amlodopine/ Vitamin D / Pepcid /  ) and Hormone Problem (Hot flashes )    Hypertension  Takes meds consistently  Seen in hospital in March for chest pain  Normal stress test.  Thought to be GI in origin  BP at goal    Colonoscopy Thursday. Hot flashes  Bothers regularly  Disrupts sleep  Loss of libido  Last period 6 months ago. Using spermicide    Urinates a lot at night      Review of Systems   Cardiovascular: Negative. Gastrointestinal: Positive for heartburn. Genitourinary: Negative. Musculoskeletal: Negative.           Past Medical History:   Diagnosis Date    Eczema     HTN (hypertension)     Hyperlipidemia     Vitamin D deficiency      Past Surgical History:   Procedure Laterality Date    HX LIPOSUCTION       Family History   Problem Relation Age of Onset    Other Mother         lucoma      Social History     Socioeconomic History    Marital status:      Spouse name: Not on file    Number of children: Not on file    Years of education: Not on file    Highest education level: Not on file   Occupational History    Not on file   Social Needs    Financial resource strain: Not on file    Food insecurity:     Worry: Not on file     Inability: Not on file    Transportation needs:     Medical: Not on file     Non-medical: Not on file   Tobacco Use    Smoking status: Never Smoker    Smokeless tobacco: Never Used   Substance and Sexual Activity    Alcohol use: Yes     Frequency: Never     Comment: rare    Drug use: No    Sexual activity: Yes     Partners: Male     Birth control/protection: Pill   Lifestyle    Physical activity:     Days per week: Not on file     Minutes per session: Not on file    Stress: Not on file   Relationships    Social connections:     Talks on phone: Not on file     Gets together: Not on file     Attends Hoahaoism service: Not on file     Active member of club or organization: Not on file     Attends meetings of clubs or organizations: Not on file     Relationship status: Not on file    Intimate partner violence:     Fear of current or ex partner: Not on file     Emotionally abused: Not on file     Physically abused: Not on file     Forced sexual activity: Not on file   Other Topics Concern    Not on file   Social History Narrative    Not on file         Current Outpatient Medications   Medication Sig Dispense Refill    pantoprazole (PROTONIX) 40 mg tablet TK 1 T PO QD  0    famotidine (PEPCID) 20 mg tablet TK 1 T PO BID  0    losartan (COZAAR) 100 mg tablet Take 1 Tab by mouth daily. 90 Tab 1    amLODIPine (NORVASC) 5 mg tablet Take 1 Tab by mouth daily. 90 Tab 1    traZODone (DESYREL) 50 mg tablet Take 1 Tab by mouth nightly. 30 Tab 2    triamcinolone acetonide (KENALOG) 0.1 % topical cream Apply  to affected area three (3) times daily.  use thin layer 60 g 2    EPINEPHrine (EPIPEN) 0.3 mg/0.3 mL injection INJECT 0.3 ML INTRAMUSCULARLY ONCE AS NEEDED FOR UP TO 1 DOSE  1    ergocalciferol (VITAMIN D2) 50,000 unit capsule Take 1 Cap by mouth every seven (7) days. 12 Cap 1    hydrocortisone (CORTAID) 0.5 % topical cream Apply  to affected area two (2) times a day. use thin layer 30 g 0    sumatriptan succ/naproxen sod (SUMATRIPTAN-NAPROXEN PO) Take  by mouth. Indications: not taking           Allergies   Allergen Reactions    Hydrochlorothiazide Swelling     LIP SWELLING    Fruit C [Ascorbic Acid (Vitamin C)] Hives    Nuts [Tree Nut] Hives    Pcn [Penicillins] Unknown (comments)     None noted        Vitals:    06/24/19 0922   BP: 139/86   Pulse: 76   Resp: 16   Temp: 98.3 °F (36.8 °C)   TempSrc: Oral   SpO2: 99%   Weight: 223 lb (101.2 kg)   Height: 5' 4\" (1.626 m)     Body mass index is 38.28 kg/m². Objective  General: Patient alert and oriented and in NAD  Neck: No thyromegaly or cervical lymphadenopathy  Cardiovascular: Heart has regular rate and rhythm, No murmurs, rubs or gallops. No edema  Respiratory: Lungs are clear to auscultation bilaterally, no wheezing, rales or rhonchi, normal chest excursion and no increased work of breathing. Musculoskeletal: All four extremities present and functional.   Skin: No rashes or lesions noted on exposed skin  Neuro: AAOx3, normal gait and speech. No gross neurologic deficits. Psych: Appropriate mood and affect, no homicidal or suicidal ideation, no obsessions, delusions or hallucinations, normal psychomotor status.

## 2019-07-02 LAB
ALBUMIN SERPL-MCNC: 4 G/DL (ref 3.5–5.5)
ALBUMIN/CREAT UR: 5.5 MG/G CREAT (ref 0–30)
ALP SERPL-CCNC: 45 IU/L (ref 39–117)
ALT SERPL-CCNC: 45 IU/L (ref 0–32)
AST SERPL-CCNC: 32 IU/L (ref 0–40)
BILIRUB DIRECT SERPL-MCNC: 0.07 MG/DL (ref 0–0.4)
BILIRUB SERPL-MCNC: 0.3 MG/DL (ref 0–1.2)
BUN SERPL-MCNC: 11 MG/DL (ref 6–24)
BUN/CREAT SERPL: 12 (ref 9–23)
CALCIUM SERPL-MCNC: 9.5 MG/DL (ref 8.7–10.2)
CHLORIDE SERPL-SCNC: 104 MMOL/L (ref 96–106)
CHOLEST SERPL-MCNC: 192 MG/DL (ref 100–199)
CO2 SERPL-SCNC: 22 MMOL/L (ref 20–29)
CREAT SERPL-MCNC: 0.94 MG/DL (ref 0.57–1)
CREAT UR-MCNC: 350 MG/DL
GLUCOSE SERPL-MCNC: 116 MG/DL (ref 65–99)
HDLC SERPL-MCNC: 59 MG/DL
LDLC SERPL CALC-MCNC: 117 MG/DL (ref 0–99)
MICROALBUMIN UR-MCNC: 19.3 UG/ML
POTASSIUM SERPL-SCNC: 3.9 MMOL/L (ref 3.5–5.2)
PROT SERPL-MCNC: 6.9 G/DL (ref 6–8.5)
SODIUM SERPL-SCNC: 140 MMOL/L (ref 134–144)
TRIGL SERPL-MCNC: 79 MG/DL (ref 0–149)
VLDLC SERPL CALC-MCNC: 16 MG/DL (ref 5–40)

## 2020-01-07 ENCOUNTER — OFFICE VISIT (OUTPATIENT)
Dept: FAMILY MEDICINE CLINIC | Age: 50
End: 2020-01-07

## 2020-01-07 VITALS
TEMPERATURE: 98.3 F | DIASTOLIC BLOOD PRESSURE: 93 MMHG | RESPIRATION RATE: 18 BRPM | HEIGHT: 64 IN | SYSTOLIC BLOOD PRESSURE: 151 MMHG | OXYGEN SATURATION: 97 % | HEART RATE: 70 BPM | WEIGHT: 229.6 LBS | BODY MASS INDEX: 39.2 KG/M2

## 2020-01-07 DIAGNOSIS — R73.01 IMPAIRED FASTING GLUCOSE: ICD-10-CM

## 2020-01-07 DIAGNOSIS — E78.00 HYPERCHOLESTEREMIA: ICD-10-CM

## 2020-01-07 DIAGNOSIS — L30.8 OTHER ECZEMA: ICD-10-CM

## 2020-01-07 DIAGNOSIS — I10 ESSENTIAL HYPERTENSION: ICD-10-CM

## 2020-01-07 DIAGNOSIS — Z00.00 ANNUAL PHYSICAL EXAM: Primary | ICD-10-CM

## 2020-01-07 DIAGNOSIS — E55.9 VITAMIN D DEFICIENCY: ICD-10-CM

## 2020-01-07 DIAGNOSIS — M65.4 DE QUERVAIN'S DISEASE (TENOSYNOVITIS): ICD-10-CM

## 2020-01-07 DIAGNOSIS — G43.709 CHRONIC MIGRAINE WITHOUT AURA WITHOUT STATUS MIGRAINOSUS, NOT INTRACTABLE: ICD-10-CM

## 2020-01-07 DIAGNOSIS — Z00.00 ANNUAL PHYSICAL EXAM: ICD-10-CM

## 2020-01-07 DIAGNOSIS — Z12.4 SCREENING FOR CERVICAL CANCER: ICD-10-CM

## 2020-01-07 DIAGNOSIS — Z12.39 SCREENING FOR BREAST CANCER: ICD-10-CM

## 2020-01-07 RX ORDER — LOSARTAN POTASSIUM 100 MG/1
TABLET ORAL
Qty: 90 TAB | Refills: 1 | Status: SHIPPED | OUTPATIENT
Start: 2020-01-07 | End: 2020-03-12

## 2020-01-07 RX ORDER — AMLODIPINE BESYLATE 10 MG/1
TABLET ORAL
Qty: 90 TAB | Refills: 1 | Status: SHIPPED | OUTPATIENT
Start: 2020-01-07 | End: 2021-01-12 | Stop reason: SDUPTHER

## 2020-01-07 RX ORDER — TRIAMCINOLONE ACETONIDE 1 MG/G
CREAM TOPICAL 3 TIMES DAILY
Qty: 60 G | Refills: 2 | Status: SHIPPED | OUTPATIENT
Start: 2020-01-07 | End: 2021-01-12 | Stop reason: SDUPTHER

## 2020-01-07 RX ORDER — SUMATRIPTAN 25 MG/1
50 TABLET, FILM COATED ORAL
Qty: 9 TAB | Refills: 1 | Status: SHIPPED | OUTPATIENT
Start: 2020-01-07 | End: 2020-01-07

## 2020-01-07 RX ORDER — DIAPER,BRIEF,INFANT-TODD,DISP
EACH MISCELLANEOUS 2 TIMES DAILY
Qty: 30 G | Refills: 1 | Status: SHIPPED | OUTPATIENT
Start: 2020-01-07 | End: 2020-01-07 | Stop reason: SDUPTHER

## 2020-01-07 RX ORDER — ERGOCALCIFEROL 1.25 MG/1
50000 CAPSULE ORAL
Qty: 12 CAP | Refills: 1 | Status: SHIPPED | OUTPATIENT
Start: 2020-01-07 | End: 2020-08-31

## 2020-01-07 NOTE — PROGRESS NOTES
Assessment/Plan:     Diagnoses and all orders for this visit:    1. Annual physical exam  -     varicella-zoster recombinant, PF, (SHINGRIX, PF,) 50 mcg/0.5 mL susr injection; 0.5 mL by IntraMUSCular route once for 1 dose. -     URINALYSIS W/ RFLX MICROSCOPIC; Future    2. Hypercholesteremia  -     LIPID PANEL; Future  - Presumed stable, labs today    3. Vitamin D deficiency  -     ergocalciferol (VITAMIN D2) 50,000 unit capsule; Take 1 Cap by mouth every thirty (30) days. -     VITAMIN D, 25 HYDROXY; Future  - Presumed stable, labs today, refill today. 4. Essential hypertension  -     amLODIPine (NORVASC) 10 mg tablet; TAKE 1 TABLET BY MOUTH DAILY  -     losartan (COZAAR) 100 mg tablet; TAKE 1 TABLET BY MOUTH DAILY  -     METABOLIC PANEL, COMPREHENSIVE; Future  - Worsening, increase amlodipine today as directed. Side effects discussed. Monitor BP at home and RTC with readings. Work on weight loss. Labs today. 5. Screening for breast cancer  -     [facility] MAMMO BI SCREENING INCL CAD; Future    6. De Quervain's disease (tenosynovitis)  -     REFERRAL TO ORTHOPEDICS  - Unchanged, wear brace every day, referral to hand specialist for possible injection and further evaluation    7. Screening for cervical cancer  -     PAP IG, APTIMA HPV AND RFX 16/18,45 ([id number])  - Follow up based on results    8. Other eczema  -     triamcinolone acetonide (KENALOG) 0.1 % topical cream; Apply  to affected area three (3) times daily. use thin layer  - Stable, continue current therapy    9. Chronic migraine without aura without status migrainosus, not intractable  -     SUMAtriptan (IMITREX) 25 mg tablet; Take 2 Tabs by mouth once as needed for Migraine for up to 1 dose. - Stable, continue current therapy    10. Impaired fasting glucose  -     HEMOGLOBIN A1C WITH EAG; Future  -     CBC WITH AUTOMATED DIFF;  Future  - Presumed stable, labs today            Discussed expected course/resolution/complications of diagnosis in detail with patient. Medication risks/benefits/costs/interactions/alternatives discussed with patient. Pt was given after visit summary which includes diagnoses, current medications & vitals. Pt expressed understanding with the diagnosis and plan        Subjective:      Jeniffer Hartmann is a 47 y.o. female who presents for had concerns including Complete Physical and Gyn Exam.     Here today for annual.  Needs pap smear today. Needs mammogram ordered. Wants shingrix ordered. Last period was in April    De quervain's syndrome  Was evaluated at ortho on call and given     HTN  151/93 today. Checks BP at home and 142/85. Takes losartan 100mg and amlodipine 5mg as directed with no reported side effects. Denies CP, SOB, palpiations or leg swelling. Urinary frequency  For the last 6-7 months urinary frequency worse at night. Denies fevers, dysuria, hematuria, urgency. Wrist pain  Was diagnosed with De quervain's syndrome in left wrist and she is left handed. Has not been wearing brace. Has been seeing chiropractor for manipulation and it is not any better. Current Outpatient Medications   Medication Sig Dispense Refill    amLODIPine (NORVASC) 10 mg tablet TAKE 1 TABLET BY MOUTH DAILY 90 Tab 1    ergocalciferol (VITAMIN D2) 50,000 unit capsule Take 1 Cap by mouth every thirty (30) days. 12 Cap 1    losartan (COZAAR) 100 mg tablet TAKE 1 TABLET BY MOUTH DAILY 90 Tab 1    triamcinolone acetonide (KENALOG) 0.1 % topical cream Apply  to affected area three (3) times daily. use thin layer 60 g 2    sumatriptan succ/naproxen sod (SUMATRIPTAN-NAPROXEN PO) Take  by mouth.  Indications: not taking      EPINEPHrine (EPIPEN) 0.3 mg/0.3 mL injection INJECT 0.3 ML INTRAMUSCULARLY ONCE AS NEEDED FOR UP TO 1 DOSE  1       Allergies   Allergen Reactions    Hydrochlorothiazide Swelling     LIP SWELLING    Fruit C [Ascorbic Acid (Vitamin C)] Hives    Nuts [Tree Nut] Hives    Pcn [Penicillins] Unknown (comments) None noted      Past Medical History:   Diagnosis Date    Eczema     HTN (hypertension)     Hyperlipidemia     Vitamin D deficiency      Past Surgical History:   Procedure Laterality Date    HX LIPOSUCTION       Family History   Problem Relation Age of Onset    Glaucoma Mother     Elevated Lipids Mother     Cancer Father      Social History     Socioeconomic History    Marital status:      Spouse name: Not on file    Number of children: Not on file    Years of education: Not on file    Highest education level: Not on file   Occupational History    Not on file   Social Needs    Financial resource strain: Not on file    Food insecurity:     Worry: Not on file     Inability: Not on file    Transportation needs:     Medical: Not on file     Non-medical: Not on file   Tobacco Use    Smoking status: Never Smoker    Smokeless tobacco: Never Used   Substance and Sexual Activity    Alcohol use: Yes     Frequency: Never     Comment: rare    Drug use: No    Sexual activity: Yes     Partners: Male     Birth control/protection: Pill   Lifestyle    Physical activity:     Days per week: Not on file     Minutes per session: Not on file    Stress: Not on file   Relationships    Social connections:     Talks on phone: Not on file     Gets together: Not on file     Attends Catholic service: Not on file     Active member of club or organization: Not on file     Attends meetings of clubs or organizations: Not on file     Relationship status: Not on file    Intimate partner violence:     Fear of current or ex partner: Not on file     Emotionally abused: Not on file     Physically abused: Not on file     Forced sexual activity: Not on file   Other Topics Concern    Not on file   Social History Narrative    Not on file       HPI      ROS:   Review of Systems   Constitutional: Negative for chills, fever and weight loss. HENT: Negative for congestion. Eyes: Negative for blurred vision. Respiratory: Negative for cough and shortness of breath. Cardiovascular: Negative for chest pain, palpitations and leg swelling. Gastrointestinal: Negative for abdominal pain, constipation, diarrhea, nausea and vomiting. Genitourinary: Positive for frequency. Negative for dysuria, flank pain, hematuria and urgency. Musculoskeletal: Positive for joint pain. Skin: Negative for rash. Neurological: Negative for dizziness and headaches. Psychiatric/Behavioral: Negative for substance abuse and suicidal ideas. The patient does not have insomnia. Objective:     Visit Vitals  BP (!) 151/93 (BP 1 Location: Left arm, BP Patient Position: Sitting)   Pulse 70   Temp 98.3 °F (36.8 °C) (Oral)   Resp 18   Ht 5' 4\" (1.626 m)   Wt 229 lb 9.6 oz (104.1 kg)   LMP 04/04/2019 (Approximate)   SpO2 97%   BMI 39.41 kg/m²         Vitals and Nurse Documentation reviewed. Physical Exam  Constitutional:       Appearance: Normal appearance. HENT:      Head: Normocephalic and atraumatic. Right Ear: Tympanic membrane normal.      Left Ear: Tympanic membrane normal.      Nose: Nose normal.      Mouth/Throat:      Dentition: Normal dentition. Eyes:      General:         Right eye: No discharge. Left eye: No discharge. Conjunctiva/sclera:      Right eye: Right conjunctiva is not injected. Left eye: Left conjunctiva is not injected. Pupils: Pupils are equal, round, and reactive to light. Neck:      Musculoskeletal: Neck supple. Thyroid: No thyroid mass or thyromegaly. Vascular: No carotid bruit. Cardiovascular:      Rate and Rhythm: Normal rate and regular rhythm. Pulses:           Dorsalis pedis pulses are 2+ on the right side and 2+ on the left side. Posterior tibial pulses are 2+ on the right side and 2+ on the left side. Heart sounds: S1 normal and S2 normal. No murmur. No friction rub. No gallop. Pulmonary:      Breath sounds: Normal breath sounds. Abdominal:      General: Bowel sounds are normal. There is no distension. Palpations: There is no mass. Tenderness: There is no tenderness. There is no right CVA tenderness or left CVA tenderness. Musculoskeletal:      Left wrist: She exhibits decreased range of motion and tenderness. Comments: Positive finkelstein left wrist   Lymphadenopathy:      Cervical: No cervical adenopathy. Skin:     General: Skin is warm and dry. Findings: No rash. Neurological:      Mental Status: She is alert. Sensory: Sensation is intact. Gait: Gait is intact. Gait normal.   Psychiatric:         Mood and Affect: Mood and affect normal.         Results for orders placed or performed in visit on 06/24/19   LIPID PANEL   Result Value Ref Range    Cholesterol, total 192 100 - 199 mg/dL    Triglyceride 79 0 - 149 mg/dL    HDL Cholesterol 59 >39 mg/dL    VLDL, calculated 16 5 - 40 mg/dL    LDL, calculated 117 (H) 0 - 99 mg/dL   MICROALBUMIN, UR, RAND W/ MICROALB/CREAT RATIO   Result Value Ref Range    Creatinine, urine 350.0 Not Estab. mg/dL    Microalbumin, urine 19.3 Not Estab. ug/mL    Microalb/Creat ratio (ug/mg creat.) 5.5 0.0 - 30.0 mg/g creat   METABOLIC PANEL, BASIC   Result Value Ref Range    Glucose 116 (H) 65 - 99 mg/dL    BUN 11 6 - 24 mg/dL    Creatinine 0.94 0.57 - 1.00 mg/dL    GFR est non-AA 71 >59 mL/min/1.73    GFR est AA 82 >59 mL/min/1.73    BUN/Creatinine ratio 12 9 - 23    Sodium 140 134 - 144 mmol/L    Potassium 3.9 3.5 - 5.2 mmol/L    Chloride 104 96 - 106 mmol/L    CO2 22 20 - 29 mmol/L    Calcium 9.5 8.7 - 10.2 mg/dL   HEPATIC FUNCTION PANEL   Result Value Ref Range    Protein, total 6.9 6.0 - 8.5 g/dL    Albumin 4.0 3.5 - 5.5 g/dL    Bilirubin, total 0.3 0.0 - 1.2 mg/dL    Bilirubin, direct 0.07 0.00 - 0.40 mg/dL    Alk.  phosphatase 45 39 - 117 IU/L    AST (SGOT) 32 0 - 40 IU/L    ALT (SGPT) 45 (H) 0 - 32 IU/L

## 2020-01-07 NOTE — PROGRESS NOTES
All health maintenance and other pertinent information has been reviewed in preparation for today's office visit. Patient presents in the office today for:    Chief Complaint   Patient presents with    Complete Physical     1. Have you been to the ER, urgent care clinic since your last visit? Hospitalized since your last visit? No    2. Have you seen or consulted any other health care providers outside of the [address] [place] [address] since your last visit? Include any pap smears or colon screening.  No

## 2020-01-13 LAB
CYTOLOGIST CVX/VAG CYTO: NORMAL
CYTOLOGY CVX/VAG DOC CYTO: NORMAL
CYTOLOGY CVX/VAG DOC THIN PREP: NORMAL
DX ICD CODE: NORMAL
HPV I/H RISK 4 DNA CVX QL PROBE+SIG AMP: NEGATIVE
Lab: NORMAL
Lab: NORMAL
OTHER STN SPEC: NORMAL
STAT OF ADQ CVX/VAG CYTO-IMP: NORMAL

## 2020-01-14 ENCOUNTER — TELEPHONE (OUTPATIENT)
Dept: FAMILY MEDICINE CLINIC | Age: 50
End: 2020-01-14

## 2020-01-14 NOTE — TELEPHONE ENCOUNTER
Endy Burton,  I spoke to Ms. Bill Dhillon, she is asking if she comes back to to re-do the pap, will she be charged?  kendall

## 2020-01-14 NOTE — PROGRESS NOTES
Please let her know the specimen we got for her pap smear was not satisfactory for various reasons and at her convenience return for pap test.

## 2020-01-14 NOTE — PROGRESS NOTES
I spoke to Ms. Charmaine Singh to let her know that we need to re do her papsmear. She is asking Maeola Kawasaki if she will be charged again. I asked [facility], she says to ask Keeley.

## 2020-01-15 NOTE — TELEPHONE ENCOUNTER
I've called [facility].. the results stated on the Mrs. Hal Schrader , is the thickness of cells on the slide during testing.  She will need to be re-collected

## 2020-01-21 ENCOUNTER — TELEPHONE (OUTPATIENT)
Dept: FAMILY MEDICINE CLINIC | Age: 50
End: 2020-01-21

## 2020-01-21 NOTE — TELEPHONE ENCOUNTER
Two patient Identification confirmed. I spoke with the patient about this Pap to be re-collected. Told her we can get her in at no charge one time. Patient reports will schedule as soon as possible. Patient verbalized understanding.

## 2020-01-24 ENCOUNTER — OFFICE VISIT (OUTPATIENT)
Dept: FAMILY MEDICINE CLINIC | Age: 50
End: 2020-01-24

## 2020-01-24 VITALS
DIASTOLIC BLOOD PRESSURE: 82 MMHG | HEART RATE: 81 BPM | BODY MASS INDEX: 39.27 KG/M2 | OXYGEN SATURATION: 100 % | TEMPERATURE: 98.6 F | SYSTOLIC BLOOD PRESSURE: 147 MMHG | RESPIRATION RATE: 16 BRPM | WEIGHT: 230 LBS | HEIGHT: 64 IN

## 2020-01-24 DIAGNOSIS — Z12.4 SCREENING FOR CERVICAL CANCER: Primary | ICD-10-CM

## 2020-01-24 NOTE — PROGRESS NOTES
Assessment/Plan:     Diagnoses and all orders for this visit:    1. Screening for cervical cancer  -     PAP IG, APTIMA HPV AND RFX 16/18,45 ([id number])            Discussed expected course/resolution/complications of diagnosis in detail with patient. Medication risks/benefits/costs/interactions/alternatives discussed with patient. Pt was given after visit summary which includes diagnoses, current medications & vitals. Pt expressed understanding with the diagnosis and plan        Subjective:      Rikki Mike is a 47 y.o. female who presents for had concerns including Other (Repeat pap ). Here today for repeat pap as pap smear came back from annual unable to be read. States has notice some discharge that is white. Had UTI in October and antibiotic. Denies itching, or vaginal odor. Current Outpatient Medications   Medication Sig Dispense Refill    amLODIPine (NORVASC) 10 mg tablet TAKE 1 TABLET BY MOUTH DAILY 90 Tab 1    ergocalciferol (VITAMIN D2) 50,000 unit capsule Take 1 Cap by mouth every thirty (30) days. 12 Cap 1    losartan (COZAAR) 100 mg tablet TAKE 1 TABLET BY MOUTH DAILY 90 Tab 1    triamcinolone acetonide (KENALOG) 0.1 % topical cream Apply  to affected area three (3) times daily. use thin layer 60 g 2    EPINEPHrine (EPIPEN) 0.3 mg/0.3 mL injection INJECT 0.3 ML INTRAMUSCULARLY ONCE AS NEEDED FOR UP TO 1 DOSE  1    sumatriptan succ/naproxen sod (SUMATRIPTAN-NAPROXEN PO) Take  by mouth.  Indications: not taking         Allergies   Allergen Reactions    Hydrochlorothiazide Swelling     LIP SWELLING    Fruit C [Ascorbic Acid (Vitamin C)] Hives    Nuts [Tree Nut] Hives    Pcn [Penicillins] Unknown (comments)     None noted      Past Medical History:   Diagnosis Date    Eczema     HTN (hypertension)     Hyperlipidemia     Vitamin D deficiency      Past Surgical History:   Procedure Laterality Date    HX LIPOSUCTION       Family History   Problem Relation Age of Onset    Glaucoma Mother     Elevated Lipids Mother     Cancer Father      Social History     Socioeconomic History    Marital status:      Spouse name: Not on file    Number of children: Not on file    Years of education: Not on file    Highest education level: Not on file   Occupational History    Not on file   Social Needs    Financial resource strain: Not on file    Food insecurity:     Worry: Not on file     Inability: Not on file    Transportation needs:     Medical: Not on file     Non-medical: Not on file   Tobacco Use    Smoking status: Never Smoker    Smokeless tobacco: Never Used   Substance and Sexual Activity    Alcohol use: Yes     Frequency: Never     Comment: rare    Drug use: No    Sexual activity: Yes     Partners: Male     Birth control/protection: Pill   Lifestyle    Physical activity:     Days per week: Not on file     Minutes per session: Not on file    Stress: Not on file   Relationships    Social connections:     Talks on phone: Not on file     Gets together: Not on file     Attends Worship service: Not on file     Active member of club or organization: Not on file     Attends meetings of clubs or organizations: Not on file     Relationship status: Not on file    Intimate partner violence:     Fear of current or ex partner: Not on file     Emotionally abused: Not on file     Physically abused: Not on file     Forced sexual activity: Not on file   Other Topics Concern    Not on file   Social History Narrative    Not on file       HPI      ROS:   ROS    Objective:     Visit Vitals  /82   Pulse 81   Temp 98.6 °F (37 °C) (Oral)   Resp 16   Ht 5' 4\" (1.626 m)   Wt 230 lb (104.3 kg)   LMP 04/02/2019   SpO2 100%   BMI 39.48 kg/m²         Vitals and Nurse Documentation reviewed. Physical Exam  Exam conducted with a chaperone present. Genitourinary:     Vagina: Vaginal discharge present. Cervix: Discharge and friability present. Comments:  Thick white discharge present no odor  Pap collected and sent        Results for orders placed or performed in visit on 01/07/20   PAP IG, APTIMA HPV AND RFX 16/18,45 ([id number])   Result Value Ref Range    Diagnosis Comment     Specimen adequacy Comment     Clinician provided ICD10 Comment     Performed by: Comment     QC reviewed by: Yamila     . Tammy Rainey      Note: Comment     Test methodology CANCELED     HPV APTIMA Negative Negative

## 2020-01-24 NOTE — PROGRESS NOTES
Identified pt with two pt identifiers(name and ). Reviewed record in preparation for visit and have obtained necessary documentation. Chief Complaint   Patient presents with    Other     Repeat pap         Health Maintenance Due   Topic    Shingrix Vaccine Age 50> (1 of 2)    BREAST CANCER SCRN MAMMOGRAM        Coordination of Care Questionnaire:  :   1) Have you been to an emergency room, urgent care, or hospitalized since your last visit? If yes, where when, and reason for visit? No       2. Have seen or consulted any other health care provider since your last visit? If yes, where when, and reason for visit?   No

## 2020-01-30 ENCOUNTER — TELEPHONE (OUTPATIENT)
Dept: FAMILY MEDICINE CLINIC | Age: 50
End: 2020-01-30

## 2020-02-01 LAB
CYTOLOGIST CVX/VAG CYTO: NORMAL
CYTOLOGY CVX/VAG DOC CYTO: NORMAL
CYTOLOGY CVX/VAG DOC THIN PREP: NORMAL
DX ICD CODE: NORMAL
HPV I/H RISK 4 DNA CVX QL PROBE+SIG AMP: NEGATIVE
Lab: NORMAL
OTHER STN SPEC: NORMAL
STAT OF ADQ CVX/VAG CYTO-IMP: NORMAL

## 2020-03-11 DIAGNOSIS — I10 ESSENTIAL HYPERTENSION: ICD-10-CM

## 2020-03-12 RX ORDER — LOSARTAN POTASSIUM 100 MG/1
TABLET ORAL
Qty: 90 TAB | Refills: 1 | Status: SHIPPED | OUTPATIENT
Start: 2020-03-12 | End: 2021-01-12 | Stop reason: SDUPTHER

## 2020-03-31 ENCOUNTER — VIRTUAL VISIT (OUTPATIENT)
Dept: FAMILY MEDICINE CLINIC | Age: 50
End: 2020-03-31

## 2020-03-31 DIAGNOSIS — N93.9 VAGINAL BLEEDING: Primary | ICD-10-CM

## 2020-03-31 NOTE — PROGRESS NOTES
Ridge Card is a 47 y.o. female who was seen by synchronous (real-time) audio-video technology on 3/31/2020. Consent:  She and/or her healthcare decision maker is aware that this patient-initiated Telehealth encounter is a billable service, with coverage as determined by her insurance carrier. She is aware that she may receive a bill and has provided verbal consent to proceed: Yes    I was in the office while conducting this encounter. Pt at her home      Assessment & Plan:   Diagnoses and all orders for this visit:    1. Vaginal bleeding   -unchanged, referred to [facility]. Had  call [facility] to see if they would see patient and they would. Called patient gave number for patient to call and make an apt. Fibroids need to be assessed and needs further evaluation  Reasons and signs and symptoms to go to ER discussed. Coding Help - Use CPT Codes 78852-25464, 47259-77624 for Established and New Patients respectively, either employing EM elements or Time rules. Other codes (example consult codes) may also apply. I spent at least 10 minutes with this established patient, and >50% of the time was spent counseling and/or coordinating care regarding vaginal bleeding  712  Subjective:   Ridge Card was seen for No chief complaint on file. Visit today for vaginal bleeding for 2 days. States had not had a period for 11 months and then yesterday got her period and then today she has just been gushing. Possibly filling up a pad an hour. She has been told last year she had fibroids when she went to the hospital for something else. She is not established with GYN. Denies SOB, dizziness when sitting or standing, CP. Prior to Admission medications    Medication Sig Start Date End Date Taking?  Authorizing Provider   losartan (COZAAR) 100 mg tablet TAKE 1 TABLET BY MOUTH DAILY 3/12/20   Malgorzata Nunez MD   amLODIPine (NORVASC) 10 mg tablet TAKE 1 TABLET BY MOUTH DAILY 1/7/20   Libia Bar NP   ergocalciferol (VITAMIN D2) 50,000 unit capsule Take 1 Cap by mouth every thirty (30) days. 1/7/20   Libia Bar NP   triamcinolone acetonide (KENALOG) 0.1 % topical cream Apply  to affected area three (3) times daily. use thin layer 1/7/20   Libia Bar NP   EPINEPHrine (EPIPEN) 0.3 mg/0.3 mL injection INJECT 0.3 ML INTRAMUSCULARLY ONCE AS NEEDED FOR UP TO 1 DOSE 12/24/18   Provider, Historical   sumatriptan succ/naproxen sod (SUMATRIPTAN-NAPROXEN PO) Take  by mouth. Indications: not taking    Provider, Historical     Allergies   Allergen Reactions    Hydrochlorothiazide Swelling     LIP SWELLING    Fruit C [Ascorbic Acid (Vitamin C)] Hives    Nuts [Tree Nut] Hives    Pcn [Penicillins] Unknown (comments)     None noted        Patient Active Problem List   Diagnosis Code    HTN (hypertension) I10    Vitamin D deficiency E55.9    Severe obesity (HCC) E66.01       Review of Systems   Constitutional: Negative for chills, fever and malaise/fatigue. Eyes: Negative for blurred vision. Respiratory: Negative for cough and shortness of breath. Cardiovascular: Negative for chest pain, palpitations and leg swelling. Genitourinary:        Vaginal bleeding   Neurological: Negative for dizziness and headaches. PHYSICAL EXAMINATION:  [ INSTRUCTIONS:  \"[x]\" Indicates a positive item  \"[]\" Indicates a negative item  -- DELETE ALL ITEMS NOT EXAMINED]  Vital Signs: (As obtained by patient/caregiver at home)  There were no vitals taken for this visit.      Constitutional: [x] Appears well-developed and well-nourished [x] No apparent distress      [] Abnormal -     Mental status: [x] Alert and awake  [x] Oriented to person/place/time [x] Able to follow commands    [] Abnormal -     Eyes:   EOM    [x]  Normal    [] Abnormal -   Sclera  [x]  Normal    [] Abnormal -          Discharge [x]  None visible   [] Abnormal -     HENT: [x] Normocephalic, atraumatic  [] Abnormal -   [x] Mouth/Throat: Mucous membranes are moist    External Ears [x] Normal  [] Abnormal -    Neck: [x] No visualized mass [] Abnormal -     Pulmonary/Chest: [x] Respiratory effort normal   [x] No visualized signs of difficulty breathing or respiratory distress        [] Abnormal -      Musculoskeletal:   [x] Normal gait with no signs of ataxia         [x] Normal range of motion of neck        [] Abnormal -     Neurological:        [x] No Facial Asymmetry (Cranial nerve 7 motor function) (limited exam due to video visit)          [x] No gaze palsy        [] Abnormal -          Skin:        [x] No significant exanthematous lesions or discoloration noted on facial skin         [] Abnormal -            Psychiatric:       [x] Normal Affect [] Abnormal -        [x] No Hallucinations    Other pertinent observable physical exam findings:-        We discussed the expected course, resolution and complications of the diagnosis(es) in detail. Medication risks, benefits, costs, interactions, and alternatives were discussed as indicated. I advised her to contact the office if her condition worsens, changes or fails to improve as anticipated. She expressed understanding with the diagnosis(es) and plan. Pursuant to the emergency declaration under the [facility] waiver authority and the [employer] Act, this Virtual  Visit was conducted, with patient's consent, to reduce the patient's risk of exposure to COVID-19 and provide continuity of care for an established patient. Services were provided through a video synchronous discussion virtually to substitute for in-person clinic visit.     Jade Wells, NP

## 2020-05-04 RX ORDER — TRAZODONE HYDROCHLORIDE 50 MG/1
TABLET ORAL
Qty: 30 TAB | Refills: 0 | Status: SHIPPED | OUTPATIENT
Start: 2020-05-04 | End: 2020-06-01 | Stop reason: SDUPTHER

## 2020-06-01 RX ORDER — TRAZODONE HYDROCHLORIDE 50 MG/1
TABLET ORAL
Qty: 30 TAB | Refills: 0 | Status: SHIPPED | OUTPATIENT
Start: 2020-06-01 | End: 2020-07-01 | Stop reason: SDUPTHER

## 2020-07-01 RX ORDER — TRAZODONE HYDROCHLORIDE 50 MG/1
TABLET ORAL
Qty: 30 TAB | Refills: 0 | Status: SHIPPED | OUTPATIENT
Start: 2020-07-01 | End: 2022-01-25 | Stop reason: ALTCHOICE

## 2021-01-12 ENCOUNTER — OFFICE VISIT (OUTPATIENT)
Dept: FAMILY MEDICINE CLINIC | Age: 51
End: 2021-01-12
Payer: COMMERCIAL

## 2021-01-12 VITALS
BODY MASS INDEX: 40.29 KG/M2 | SYSTOLIC BLOOD PRESSURE: 149 MMHG | HEART RATE: 84 BPM | TEMPERATURE: 96.2 F | HEIGHT: 64 IN | OXYGEN SATURATION: 100 % | RESPIRATION RATE: 16 BRPM | WEIGHT: 236 LBS | DIASTOLIC BLOOD PRESSURE: 86 MMHG

## 2021-01-12 DIAGNOSIS — E66.01 SEVERE OBESITY (HCC): ICD-10-CM

## 2021-01-12 DIAGNOSIS — M25.461 PAIN AND SWELLING OF KNEE, RIGHT: ICD-10-CM

## 2021-01-12 DIAGNOSIS — Z00.00 ANNUAL PHYSICAL EXAM: Primary | ICD-10-CM

## 2021-01-12 DIAGNOSIS — E55.9 VITAMIN D DEFICIENCY: ICD-10-CM

## 2021-01-12 DIAGNOSIS — L30.8 OTHER ECZEMA: ICD-10-CM

## 2021-01-12 DIAGNOSIS — I10 ESSENTIAL HYPERTENSION: ICD-10-CM

## 2021-01-12 DIAGNOSIS — M25.561 PAIN AND SWELLING OF KNEE, RIGHT: ICD-10-CM

## 2021-01-12 DIAGNOSIS — R73.01 ELEVATED FASTING GLUCOSE: ICD-10-CM

## 2021-01-12 DIAGNOSIS — E78.00 HYPERCHOLESTEREMIA: ICD-10-CM

## 2021-01-12 DIAGNOSIS — Z12.39 ENCOUNTER FOR SCREENING FOR MALIGNANT NEOPLASM OF BREAST, UNSPECIFIED SCREENING MODALITY: ICD-10-CM

## 2021-01-12 DIAGNOSIS — R35.1 NOCTURIA: ICD-10-CM

## 2021-01-12 DIAGNOSIS — G43.709 CHRONIC MIGRAINE WITHOUT AURA WITHOUT STATUS MIGRAINOSUS, NOT INTRACTABLE: ICD-10-CM

## 2021-01-12 PROCEDURE — 99396 PREV VISIT EST AGE 40-64: CPT | Performed by: NURSE PRACTITIONER

## 2021-01-12 PROCEDURE — 99213 OFFICE O/P EST LOW 20 MIN: CPT | Performed by: NURSE PRACTITIONER

## 2021-01-12 RX ORDER — SUMATRIPTAN AND NAPROXEN SODIUM 85; 500 MG/1; MG/1
1 TABLET, FILM COATED ORAL AS NEEDED
Qty: 9 TAB | Refills: 2 | Status: SHIPPED | OUTPATIENT
Start: 2021-01-12 | End: 2021-08-02 | Stop reason: SDUPTHER

## 2021-01-12 RX ORDER — LOSARTAN POTASSIUM 100 MG/1
100 TABLET ORAL DAILY
Qty: 90 TAB | Refills: 1 | Status: SHIPPED | OUTPATIENT
Start: 2021-01-12 | End: 2021-03-03 | Stop reason: SDUPTHER

## 2021-01-12 RX ORDER — ERGOCALCIFEROL 1.25 MG/1
CAPSULE ORAL
Qty: 12 CAP | Refills: 0 | Status: SHIPPED | OUTPATIENT
Start: 2021-01-12 | End: 2022-01-25 | Stop reason: SDUPTHER

## 2021-01-12 RX ORDER — ZOSTER VACCINE RECOMBINANT, ADJUVANTED 50 MCG/0.5
0.5 KIT INTRAMUSCULAR ONCE
Qty: 0.5 ML | Refills: 0 | Status: SHIPPED | OUTPATIENT
Start: 2021-01-12 | End: 2021-01-12

## 2021-01-12 RX ORDER — AMLODIPINE BESYLATE 10 MG/1
TABLET ORAL
Qty: 90 TAB | Refills: 1 | Status: SHIPPED | OUTPATIENT
Start: 2021-01-12 | End: 2021-11-23

## 2021-01-12 RX ORDER — TRIAMCINOLONE ACETONIDE 1 MG/G
CREAM TOPICAL 3 TIMES DAILY
Qty: 60 G | Refills: 2 | Status: SHIPPED | OUTPATIENT
Start: 2021-01-12 | End: 2021-09-28

## 2021-01-12 NOTE — PROGRESS NOTES
Assessment/Plan:     Diagnoses and all orders for this visit:    1. Annual physical exam  -     [facility] MAMMO BI SCREENING INCL CAD; Future  -     varicella-zoster recombinant, PF, (Shingrix, PF,) 50 mcg/0.5 mL susr injection; 0.5 mL by IntraMUSCular route once for 1 dose. -     URINALYSIS W/ RFLX MICROSCOPIC; Future    2. Encounter for screening for malignant neoplasm of breast, unspecified screening modality  -     [facility] MAMMO BI SCREENING INCL CAD; Future    3. Essential hypertension  -     METABOLIC PANEL, COMPREHENSIVE; Future  -     MICROALBUMIN, UR, RAND W/ MICROALB/CREAT RATIO; Future  -     amLODIPine (NORVASC) 10 mg tablet; TAKE 1 TABLET BY MOUTH DAILY  -     losartan (COZAAR) 100 mg tablet; Take 1 Tab by mouth daily.  - Elevated today, monitor BP at home and RTC in 3 weeks with BP readings    4. Vitamin D deficiency  -     VITAMIN D, 25 HYDROXY; Future  -     ergocalciferol (ERGOCALCIFEROL) 1,250 mcg (50,000 unit) capsule; TAKE 1 CAPSULE BY MOUTH EVERY 30 DAYS  - Presumed stable, labs today, refill today    5. Severe obesity ([address]  -     TSH 3RD GENERATION; Future  - Work on diet and exercise for weight loss    6. Hypercholesteremia  -     LIPID PANEL; Future  - Presumed stable, labs today      7. Elevated fasting glucose  -     HEMOGLOBIN A1C WITH EAG; Future  -     CBC WITH AUTOMATED DIFF; Future  - Presumed stable, labs today    8. Other eczema  -     triamcinolone acetonide (KENALOG) 0.1 % topical cream; Apply  to affected area three (3) times daily. use thin layer  - Stable, continue current therapy    9. Chronic migraine without aura without status migrainosus, not intractable  -     SUMAtriptan-naproxen (TREXIMET)  mg tablet; Take 1 Tab by mouth as needed (headache). 10. Pain and swelling of knee, right  -     XR KNEE RT MIN 4 V; Future  - Worsening, xray today, follow up based on results,  May continue ice and may also try Ibuprofen    11.  Nocturia   -unchanged, stop fluids a couple hours before bedtime. Lab work today. If symptoms continue follow up. Follow-up and Dispositions    · Return in about 6 months (around 7/12/2021) for Follow Up. Discussed expected course/resolution/complications of diagnosis in detail with patient. Medication risks/benefits/costs/interactions/alternatives discussed with patient. Pt was given after visit summary which includes diagnoses, current medications & vitals. Pt expressed understanding with the diagnosis and plan        Subjective:      Alonza Simmonds is a 45 y.o. female who presents for had concerns including Complete Physical (Patient states right knee swelling. x2 weeks Patient denies any injury. Patient reports face breaking out from mask.) and Medication Refill (Patient states would like written rx.). Here today for complete care. Sees dentist every 6 months. Vision exam up to date. Ordering a mammogram today. Has a history of severe allergies and nuts and carries an Epi pen. HTN  Takes medications as prescribed. Checks BP at home and gets normal readings. Denies CP, SOB, palpitations or leg swelling. Urinary Frequency  Has urinary frequency, just at night. States having excessive thirst and drinks right up until bed time. Knee Pain  Patient complains of right knee swelling and knee pain when it rains. Onset of the symptoms was several months ago. Inciting event: none known. Current symptoms include knee gives out, knee locks and crepitus sensation. Associated symptoms: knee giving out and knee locking. Aggravating symptoms: none known. . Patient's overall course: gradually worsening Patient has had no prior knee problems. Patient denies none. Evaluation to date: none. Treatment to date: icing: somewhat effective. Current Outpatient Medications   Medication Sig Dispense Refill    varicella-zoster recombinant, PF, (Shingrix, PF,) 50 mcg/0.5 mL susr injection 0.5 mL by IntraMUSCular route once for 1 dose.  0.5 mL 0    amLODIPine (NORVASC) 10 mg tablet TAKE 1 TABLET BY MOUTH DAILY 90 Tab 1    SUMAtriptan-naproxen (TREXIMET)  mg tablet Take 1 Tab by mouth as needed (headache). Indications: not taking 9 Tab 2    triamcinolone acetonide (KENALOG) 0.1 % topical cream Apply  to affected area three (3) times daily. use thin layer 60 g 2    ergocalciferol (ERGOCALCIFEROL) 1,250 mcg (50,000 unit) capsule TAKE 1 CAPSULE BY MOUTH EVERY 30 DAYS 12 Cap 0    losartan (COZAAR) 100 mg tablet Take 1 Tab by mouth daily.  90 Tab 1    EPINEPHrine (EPIPEN) 0.3 mg/0.3 mL injection INJECT 0.3 ML INTRAMUSCULARLY ONCE AS NEEDED FOR UP TO 1 DOSE 1 Syringe 1    traZODone (DESYREL) 50 mg tablet TAKE 1 TABLET BY MOUTH NIGHTLY 30 Tab 0       Allergies   Allergen Reactions    Hydrochlorothiazide Swelling     LIP SWELLING    Fruit C [Ascorbic Acid (Vitamin C)] Hives    Nuts [Tree Nut] Hives    Pcn [Penicillins] Unknown (comments)     None noted      Past Medical History:   Diagnosis Date    Eczema     HTN (hypertension)     Hyperlipidemia     Vitamin D deficiency      Past Surgical History:   Procedure Laterality Date    HX LIPOSUCTION       Family History   Problem Relation Age of Onset    Glaucoma Mother     Elevated Lipids Mother     Cancer Father      Social History     Socioeconomic History    Marital status:      Spouse name: Not on file    Number of children: Not on file    Years of education: Not on file    Highest education level: Not on file   Occupational History    Not on file   Social Needs    Financial resource strain: Not on file    Food insecurity     Worry: Not on file     Inability: Not on file    Transportation needs     Medical: Not on file     Non-medical: Not on file   Tobacco Use    Smoking status: Never Smoker    Smokeless tobacco: Never Used   Substance and Sexual Activity    Alcohol use: Not Currently     Frequency: Never     Comment: rare    Drug use: No    Sexual activity: Yes Partners: Male     Birth control/protection: Pill   Lifestyle    Physical activity     Days per week: Not on file     Minutes per session: Not on file    Stress: Not on file   Relationships    Social connections     Talks on phone: Not on file     Gets together: Not on file     Attends Restoration service: Not on file     Active member of club or organization: Not on file     Attends meetings of clubs or organizations: Not on file     Relationship status: Not on file    Intimate partner violence     Fear of current or ex partner: Not on file     Emotionally abused: Not on file     Physically abused: Not on file     Forced sexual activity: Not on file   Other Topics Concern    Not on file   Social History Narrative    Not on file       HPI      ROS:   Review of Systems   Constitutional: Negative for chills, fever and weight loss. Eyes: Negative for blurred vision. Respiratory: Negative for cough. Cardiovascular: Negative for chest pain and palpitations. Gastrointestinal: Negative for constipation, nausea and vomiting. Genitourinary: Negative for dysuria. Musculoskeletal: Negative for joint pain. Skin: Negative for rash. Neurological: Negative for dizziness and headaches. Psychiatric/Behavioral: Negative for substance abuse and suicidal ideas. The patient does not have insomnia. Objective:     Visit Vitals  BP (!) 149/86 (BP 1 Location: Left arm, BP Patient Position: Sitting)   Pulse 84   Temp (!) 96.2 °F (35.7 °C) (Skin)   Resp 16   Ht 5' 4\" (1.626 m)   Wt 236 lb (107 kg)   LMP 09/17/2020   SpO2 100%   BMI 40.51 kg/m²         Vitals and Nurse Documentation reviewed. Physical Exam  Constitutional:       General: She is not in acute distress. Appearance: Normal appearance. She is not diaphoretic. HENT:      Head: Normocephalic and atraumatic.       Right Ear: Tympanic membrane normal.      Left Ear: Tympanic membrane normal.      Nose: Nose normal.      Mouth/Throat: Dentition: Normal dentition. Eyes:      General:         Right eye: No discharge. Left eye: No discharge. Conjunctiva/sclera:      Right eye: Right conjunctiva is not injected. Left eye: Left conjunctiva is not injected. Pupils: Pupils are equal, round, and reactive to light. Neck:      Musculoskeletal: Neck supple. Thyroid: No thyroid mass or thyromegaly. Vascular: No carotid bruit. Cardiovascular:      Rate and Rhythm: Normal rate and regular rhythm. Pulses: Normal pulses. Dorsalis pedis pulses are 2+ on the right side and 2+ on the left side. Posterior tibial pulses are 2+ on the right side and 2+ on the left side. Heart sounds: S1 normal and S2 normal. No murmur. No friction rub. No gallop. Pulmonary:      Effort: Pulmonary effort is normal.      Breath sounds: Normal breath sounds. Abdominal:      General: Bowel sounds are normal. There is no distension. Palpations: There is no mass. Tenderness: There is no abdominal tenderness. Musculoskeletal: Normal range of motion. Right knee: She exhibits abnormal meniscus. She exhibits normal range of motion, no swelling and normal alignment. No tenderness found. No patellar tendon tenderness noted. Comments: Normal flexion and extension  No weakness  Swelling noted lateral side right knee  No effusion  no erythema. Normal patella tracking  Negative anterior and posterior drawer test  Not able to perform Angel test   Lymphadenopathy:      Cervical: No cervical adenopathy. Skin:     General: Skin is warm and dry. Findings: No rash. Neurological:      Mental Status: She is alert. Sensory: Sensation is intact. Gait: Gait is intact.  Gait normal.   Psychiatric:         Mood and Affect: Mood and affect normal.         Results for orders placed or performed in visit on 01/24/20   PAP IG, APTIMA HPV AND RFX 16/18,45 ([id number])   Result Value Ref Range    Diagnosis Comment Specimen adequacy Comment     Clinician provided ICD10 Comment     Performed by: Comment     . Andrez Yang      Note: Comment     Test methodology Comment     HPV APTIMA Negative Negative

## 2021-01-12 NOTE — PROGRESS NOTES
1. Have you been to the ER, urgent care clinic since your last visit? Hospitalized since your last visit? No    2. Have you seen or consulted any other health care providers outside of the [facility] since your last visit? Include any pap smears or colon screening. No     Ph[facility] verified. [id number] - [place], [place] - [address] Chief Complaint   Patient presents with    Complete Physical     Patient states right knee swelling. x2 weeks Patient denies any injury. Patient reports face breaking out from mask.  Medication Refill     Patient states would like written rx.      Health Maintenance Due   Topic Date Due    Shingrix Vaccine Age 49> (1 of 2) 11/08/2019    DTaP/Tdap/Td series (2 - Td) 07/06/2020    Flu Vaccine (1) 09/01/2020     3 most recent PHQ Screens 1/12/2021   Little interest or pleasure in doing things Several days   Feeling down, depressed, irritable, or hopeless Several days   Total Score PHQ 2 2     Visit Vitals  BP (!) 149/86 (BP 1 Location: Left arm, BP Patient Position: Sitting)   Pulse 84   Temp (!) 96.2 °F (35.7 °C) (Skin)   Resp 16   Ht 5' 4\" (1.626 m)   Wt 236 lb (107 kg)   SpO2 100%   BMI 40.51 kg/m²

## 2021-01-13 LAB
25(OH)D3+25(OH)D2 SERPL-MCNC: 21.5 NG/ML (ref 30–100)
ALBUMIN SERPL-MCNC: 4.4 G/DL (ref 3.8–4.9)
ALBUMIN/CREAT UR: 2 MG/G CREAT (ref 0–29)
ALBUMIN/GLOB SERPL: 1.5 {RATIO} (ref 1.2–2.2)
ALP SERPL-CCNC: 64 IU/L (ref 39–117)
ALT SERPL-CCNC: 31 IU/L (ref 0–32)
APPEARANCE UR: CLEAR
AST SERPL-CCNC: 22 IU/L (ref 0–40)
BASOPHILS # BLD AUTO: 0.1 X10E3/UL (ref 0–0.2)
BASOPHILS NFR BLD AUTO: 1 %
BILIRUB SERPL-MCNC: <0.2 MG/DL (ref 0–1.2)
BILIRUB UR QL STRIP: NEGATIVE
BUN SERPL-MCNC: 14 MG/DL (ref 6–24)
BUN/CREAT SERPL: 16 (ref 9–23)
CALCIUM SERPL-MCNC: 9.6 MG/DL (ref 8.7–10.2)
CHLORIDE SERPL-SCNC: 104 MMOL/L (ref 96–106)
CHOLEST SERPL-MCNC: 193 MG/DL (ref 100–199)
CO2 SERPL-SCNC: 22 MMOL/L (ref 20–29)
COLOR UR: YELLOW
CREAT SERPL-MCNC: 0.89 MG/DL (ref 0.57–1)
CREAT UR-MCNC: 171.8 MG/DL
EOSINOPHIL # BLD AUTO: 0.1 X10E3/UL (ref 0–0.4)
EOSINOPHIL NFR BLD AUTO: 1 %
ERYTHROCYTE [DISTWIDTH] IN BLOOD BY AUTOMATED COUNT: 13.7 % (ref 11.7–15.4)
EST. AVERAGE GLUCOSE BLD GHB EST-MCNC: 128 MG/DL
GLOBULIN SER CALC-MCNC: 3 G/DL (ref 1.5–4.5)
GLUCOSE SERPL-MCNC: 112 MG/DL (ref 65–99)
GLUCOSE UR QL: NEGATIVE
HBA1C MFR BLD: 6.1 % (ref 4.8–5.6)
HCT VFR BLD AUTO: 41 % (ref 34–46.6)
HDLC SERPL-MCNC: 62 MG/DL
HGB BLD-MCNC: 12.8 G/DL (ref 11.1–15.9)
HGB UR QL STRIP: NEGATIVE
IMM GRANULOCYTES # BLD AUTO: 0 X10E3/UL (ref 0–0.1)
IMM GRANULOCYTES NFR BLD AUTO: 0 %
KETONES UR QL STRIP: NEGATIVE
LDLC SERPL CALC-MCNC: 119 MG/DL (ref 0–99)
LEUKOCYTE ESTERASE UR QL STRIP: NEGATIVE
LYMPHOCYTES # BLD AUTO: 2.9 X10E3/UL (ref 0.7–3.1)
LYMPHOCYTES NFR BLD AUTO: 55 %
MCH RBC QN AUTO: 25.4 PG (ref 26.6–33)
MCHC RBC AUTO-ENTMCNC: 31.2 G/DL (ref 31.5–35.7)
MCV RBC AUTO: 81 FL (ref 79–97)
MICRO URNS: NORMAL
MICROALBUMIN UR-MCNC: 4.2 UG/ML
MONOCYTES # BLD AUTO: 0.4 X10E3/UL (ref 0.1–0.9)
MONOCYTES NFR BLD AUTO: 7 %
NEUTROPHILS # BLD AUTO: 1.9 X10E3/UL (ref 1.4–7)
NEUTROPHILS NFR BLD AUTO: 36 %
NITRITE UR QL STRIP: NEGATIVE
PH UR STRIP: 5 [PH] (ref 5–7.5)
PLATELET # BLD AUTO: 314 X10E3/UL (ref 150–450)
POTASSIUM SERPL-SCNC: 4.2 MMOL/L (ref 3.5–5.2)
PROT SERPL-MCNC: 7.4 G/DL (ref 6–8.5)
PROT UR QL STRIP: NEGATIVE
RBC # BLD AUTO: 5.04 X10E6/UL (ref 3.77–5.28)
SODIUM SERPL-SCNC: 140 MMOL/L (ref 134–144)
SP GR UR: 1.02 (ref 1–1.03)
TRIGL SERPL-MCNC: 65 MG/DL (ref 0–149)
TSH SERPL DL<=0.005 MIU/L-ACNC: 0.93 UIU/ML (ref 0.45–4.5)
UROBILINOGEN UR STRIP-MCNC: 0.2 MG/DL (ref 0.2–1)
VLDLC SERPL CALC-MCNC: 12 MG/DL (ref 5–40)
WBC # BLD AUTO: 5.4 X10E3/UL (ref 3.4–10.8)

## 2021-01-18 ENCOUNTER — TELEPHONE (OUTPATIENT)
Dept: FAMILY MEDICINE CLINIC | Age: 51
End: 2021-01-18

## 2021-01-18 NOTE — TELEPHONE ENCOUNTER
----- Message from Conchetta Hatchet, NP sent at 1/15/2021 10:22 AM EST -----  Please have patient make virtual apt for low vitamin d. Copy of labs in the mail.

## 2021-03-03 DIAGNOSIS — I10 ESSENTIAL HYPERTENSION: ICD-10-CM

## 2021-03-03 RX ORDER — LOSARTAN POTASSIUM 100 MG/1
100 TABLET ORAL DAILY
Qty: 90 TAB | Refills: 1 | Status: SHIPPED | OUTPATIENT
Start: 2021-03-03 | End: 2022-01-25 | Stop reason: SDUPTHER

## 2021-08-02 DIAGNOSIS — G43.709 CHRONIC MIGRAINE WITHOUT AURA WITHOUT STATUS MIGRAINOSUS, NOT INTRACTABLE: ICD-10-CM

## 2021-08-03 RX ORDER — SUMATRIPTAN AND NAPROXEN SODIUM 85; 500 MG/1; MG/1
1 TABLET, FILM COATED ORAL AS NEEDED
Qty: 9 TABLET | Refills: 2 | Status: SHIPPED | OUTPATIENT
Start: 2021-08-03 | End: 2021-08-04 | Stop reason: SDUPTHER

## 2021-08-04 DIAGNOSIS — G43.709 CHRONIC MIGRAINE WITHOUT AURA WITHOUT STATUS MIGRAINOSUS, NOT INTRACTABLE: ICD-10-CM

## 2021-08-04 RX ORDER — SUMATRIPTAN AND NAPROXEN SODIUM 85; 500 MG/1; MG/1
1 TABLET, FILM COATED ORAL AS NEEDED
Qty: 9 TABLET | Refills: 2 | Status: SHIPPED | OUTPATIENT
Start: 2021-08-04 | End: 2021-08-05 | Stop reason: ALTCHOICE

## 2021-08-04 NOTE — TELEPHONE ENCOUNTER
----- Message from Bhumika Espinal sent at 8/4/2021  9:42 AM EDT -----  Regarding: /Refill  Medication Refill    Caller (if not patient):PT      Relationship of caller (if not patient):N/A      Best contact number(s):[phone]      Name of medication and dosage if known:Sumatriptan      Is patient out of this medication (yes/no):Yes      Pharmacy name:[facility]    Pharmacy listed in chart? (yes/no):Yes  Pharmacy phone [phone]      Details to clarify the request: Script needs to be without the naproxen as insurance will not cover it      Bhumika Espinal

## 2021-08-05 ENCOUNTER — TELEPHONE (OUTPATIENT)
Dept: FAMILY MEDICINE CLINIC | Age: 51
End: 2021-08-05

## 2021-08-05 DIAGNOSIS — G43.709 CHRONIC MIGRAINE WITHOUT AURA WITHOUT STATUS MIGRAINOSUS, NOT INTRACTABLE: Primary | ICD-10-CM

## 2021-08-05 RX ORDER — SUMATRIPTAN 100 MG/1
TABLET, FILM COATED ORAL
Qty: 9 TABLET | Refills: 2 | Status: SHIPPED | OUTPATIENT
Start: 2021-08-05 | End: 2022-01-25 | Stop reason: SDUPTHER

## 2021-08-05 NOTE — TELEPHONE ENCOUNTER
Pt rx Sumatriptan will need to be resent with out Naproxen added insurance will not cover medication with naproxen in it         Would like it to be sent to [facility] on [address]

## 2021-09-27 DIAGNOSIS — L30.8 OTHER ECZEMA: ICD-10-CM

## 2021-09-28 RX ORDER — TRIAMCINOLONE ACETONIDE 1 MG/G
CREAM TOPICAL
Qty: 60 G | Refills: 2 | Status: SHIPPED | OUTPATIENT
Start: 2021-09-28 | End: 2022-01-25 | Stop reason: SDUPTHER

## 2021-10-18 RX ORDER — EPINEPHRINE 0.3 MG/.3ML
INJECTION SUBCUTANEOUS
Qty: 2 EACH | Refills: 1 | Status: SHIPPED | OUTPATIENT
Start: 2021-10-18 | End: 2022-01-25 | Stop reason: SDUPTHER

## 2021-11-23 DIAGNOSIS — I10 ESSENTIAL HYPERTENSION: ICD-10-CM

## 2021-11-23 RX ORDER — AMLODIPINE BESYLATE 10 MG/1
TABLET ORAL
Qty: 90 TABLET | Refills: 1 | Status: SHIPPED | OUTPATIENT
Start: 2021-11-23 | End: 2022-01-25 | Stop reason: SDUPTHER

## 2022-01-25 ENCOUNTER — TELEPHONE (OUTPATIENT)
Dept: FAMILY MEDICINE CLINIC | Age: 52
End: 2022-01-25

## 2022-01-25 ENCOUNTER — OFFICE VISIT (OUTPATIENT)
Dept: FAMILY MEDICINE CLINIC | Age: 52
End: 2022-01-25

## 2022-01-25 VITALS
HEART RATE: 86 BPM | RESPIRATION RATE: 16 BRPM | SYSTOLIC BLOOD PRESSURE: 141 MMHG | OXYGEN SATURATION: 98 % | TEMPERATURE: 98.2 F | BODY MASS INDEX: 39.54 KG/M2 | WEIGHT: 231.6 LBS | DIASTOLIC BLOOD PRESSURE: 88 MMHG | HEIGHT: 64 IN

## 2022-01-25 DIAGNOSIS — L30.8 OTHER ECZEMA: ICD-10-CM

## 2022-01-25 DIAGNOSIS — Z12.31 ENCOUNTER FOR SCREENING MAMMOGRAM FOR MALIGNANT NEOPLASM OF BREAST: ICD-10-CM

## 2022-01-25 DIAGNOSIS — R73.01 ELEVATED FASTING GLUCOSE: ICD-10-CM

## 2022-01-25 DIAGNOSIS — L70.8 OTHER ACNE: ICD-10-CM

## 2022-01-25 DIAGNOSIS — E55.9 VITAMIN D DEFICIENCY: ICD-10-CM

## 2022-01-25 DIAGNOSIS — I10 ESSENTIAL HYPERTENSION: ICD-10-CM

## 2022-01-25 DIAGNOSIS — G43.709 CHRONIC MIGRAINE WITHOUT AURA WITHOUT STATUS MIGRAINOSUS, NOT INTRACTABLE: ICD-10-CM

## 2022-01-25 DIAGNOSIS — Z00.00 ANNUAL PHYSICAL EXAM: Primary | ICD-10-CM

## 2022-01-25 DIAGNOSIS — E66.01 SEVERE OBESITY (HCC): ICD-10-CM

## 2022-01-25 DIAGNOSIS — E78.00 HYPERCHOLESTEREMIA: ICD-10-CM

## 2022-01-25 PROCEDURE — 99396 PREV VISIT EST AGE 40-64: CPT | Performed by: NURSE PRACTITIONER

## 2022-01-25 RX ORDER — CLINDAMYCIN PHOSPHATE 10 MG/G
GEL TOPICAL 2 TIMES DAILY
Qty: 30 G | Refills: 1 | Status: SHIPPED | OUTPATIENT
Start: 2022-01-25

## 2022-01-25 RX ORDER — AMLODIPINE BESYLATE 10 MG/1
10 TABLET ORAL DAILY
Qty: 90 TABLET | Refills: 1 | Status: SHIPPED | OUTPATIENT
Start: 2022-01-25

## 2022-01-25 RX ORDER — ERGOCALCIFEROL 1.25 MG/1
CAPSULE ORAL
Qty: 12 CAPSULE | Refills: 0 | Status: SHIPPED | OUTPATIENT
Start: 2022-01-25

## 2022-01-25 RX ORDER — LOSARTAN POTASSIUM 100 MG/1
100 TABLET ORAL DAILY
Qty: 90 TABLET | Refills: 1 | Status: SHIPPED | OUTPATIENT
Start: 2022-01-25 | End: 2022-02-22

## 2022-01-25 RX ORDER — TRIAMCINOLONE ACETONIDE 1 MG/G
CREAM TOPICAL 2 TIMES DAILY
Qty: 60 G | Refills: 2 | Status: SHIPPED | OUTPATIENT
Start: 2022-01-25

## 2022-01-25 RX ORDER — SUMATRIPTAN 25 MG/1
TABLET, FILM COATED ORAL
Qty: 9 TABLET | Refills: 2 | Status: SHIPPED | OUTPATIENT
Start: 2022-01-25

## 2022-01-25 RX ORDER — EPINEPHRINE 0.3 MG/.3ML
INJECTION SUBCUTANEOUS
Qty: 2 EACH | Refills: 1 | Status: SHIPPED | OUTPATIENT
Start: 2022-01-25

## 2022-01-25 NOTE — PROGRESS NOTES
Identified pt with two pt identifiers(name and ). Reviewed record in preparation for visit and have obtained necessary documentation. Chief Complaint   Patient presents with    Physical    Labs    Other     Requesting mammogram order        Health Maintenance Due   Topic    Hepatitis C Screening     COVID-19 Vaccine (1)    Shingrix Vaccine Age 50> (1 of 2)    DTaP/Tdap/Td series (2 - Td or Tdap)    Breast Cancer Screen Mammogram        Coordination of Care Questionnaire:  :   1) Have you been to an emergency room, urgent care, or hospitalized since your last visit? If yes, where when, and reason for visit? no      2. Have seen or consulted any other health care provider since your last visit? If yes, where when, and reason for visit? no        Patient is accompanied by self I have received verbal consent from Virgilio Alcala to discuss any/all medical information while they are present in the room.

## 2022-01-25 NOTE — TELEPHONE ENCOUNTER
She states you forgot to print off her prescriptions for amlodipine and triamcinolone ointment. I said maybe they were electronically sent but she said that you were supposed to print them all.

## 2022-01-25 NOTE — PATIENT INSTRUCTIONS
Acne: Care Instructions  Overview  Acne is a skin problem. It shows up as blackheads, whiteheads, and pimples. Acne most often affects the face, neck, and upper body. It occurs when oil and dead skin cells clog the skin's pores. Acne usually starts during the teen years and often lasts into adulthood. Gentle cleansing every day controls most mild acne. If home treatment doesn't work, your doctor may prescribe a cream, an antibiotic, or a stronger medicine called isotretinoin. Sometimes birth control pills help women who have monthly acne flare-ups. Follow-up care is a key part of your treatment and safety. Be sure to make and go to all appointments, and call your doctor if you are having problems. It's also a good idea to know your test results and keep a list of the medicines you take. How can you care for yourself at home? · Gently wash your face 1 or 2 times a day with warm (not hot) water and a mild soap or cleanser. Always rinse well. · Use an over-the-counter lotion or gel that contains benzoyl peroxide. Start with a small amount of 2.5% benzoyl peroxide and increase the strength as needed. Benzoyl peroxide works well for acne, but you may need to use it for up to 2 months before your acne starts to improve. · Apply acne cream, lotion, or gel to all the places you get pimples, blackheads, or whiteheads, not just where you have them now. Follow the instructions carefully. If your skin gets too dry and scaly or red and sore, reduce the amount. For the best results, apply medicines as directed. Try not to miss doses. · Do not squeeze or pick pimples and blackheads. This can cause infection and scarring. · Use only oil-free makeup, sunscreen, and other skin care products that will not clog your pores. · Wash your hair every day, and try to keep it off your face and shoulders. Consider pinning it back or cutting it short. When should you call for help?   Watch closely for changes in your health, and be sure to contact your doctor if:    · You have tried home treatment for 6 to 8 weeks and your acne is not better or gets worse. Your doctor may need to add to or change your treatment.     · Your pimples become large and hard or filled with fluid.     · Scars form after pimples heal.     · You feel sad or hopeless, lack energy, or have other signs of depression while you are taking the prescription medicine isotretinoin.     · You start to have other symptoms, such as facial hair growth in women or bone and muscle pain. Where can you learn more? Go to http://www.gray.com/  Enter V108 in the search box to learn more about \"Acne: Care Instructions. \"  Current as of: March 3, 2021               Content Version: 13.0  © 6388-4656 [employer], [employer]. Care instructions adapted under license by [employer] (which disclaims liability or warranty for this information). If you have questions about a medical condition or this instruction, always ask your healthcare professional. [place] [address] any warranty or liability for your use of this information.

## 2022-02-18 DIAGNOSIS — Z12.31 ENCOUNTER FOR SCREENING MAMMOGRAM FOR MALIGNANT NEOPLASM OF BREAST: ICD-10-CM

## 2022-02-21 DIAGNOSIS — I10 ESSENTIAL HYPERTENSION: ICD-10-CM

## 2022-02-22 RX ORDER — LOSARTAN POTASSIUM 100 MG/1
TABLET ORAL
Qty: 90 TABLET | Refills: 1 | Status: SHIPPED | OUTPATIENT
Start: 2022-02-22

## 2023-01-28 DIAGNOSIS — I10 ESSENTIAL HYPERTENSION: ICD-10-CM

## 2023-01-30 RX ORDER — AMLODIPINE BESYLATE 10 MG/1
10 TABLET ORAL DAILY
Qty: 90 TABLET | Refills: 1 | Status: SHIPPED | OUTPATIENT
Start: 2023-01-30 | End: 2023-02-01 | Stop reason: SDUPTHER